# Patient Record
Sex: FEMALE | Race: WHITE | NOT HISPANIC OR LATINO | Employment: FULL TIME | URBAN - METROPOLITAN AREA
[De-identification: names, ages, dates, MRNs, and addresses within clinical notes are randomized per-mention and may not be internally consistent; named-entity substitution may affect disease eponyms.]

---

## 2017-07-24 ENCOUNTER — OFFICE VISIT (OUTPATIENT)
Dept: OBSTETRICS AND GYNECOLOGY | Age: 47
End: 2017-07-24

## 2017-07-24 VITALS
WEIGHT: 287 LBS | DIASTOLIC BLOOD PRESSURE: 80 MMHG | SYSTOLIC BLOOD PRESSURE: 126 MMHG | HEIGHT: 62 IN | BODY MASS INDEX: 52.81 KG/M2

## 2017-07-24 DIAGNOSIS — Z12.4 SCREENING FOR CERVICAL CANCER: ICD-10-CM

## 2017-07-24 DIAGNOSIS — Z01.419 ENCOUNTER FOR GYNECOLOGICAL EXAMINATION WITHOUT ABNORMAL FINDING: Primary | ICD-10-CM

## 2017-07-24 PROCEDURE — 99396 PREV VISIT EST AGE 40-64: CPT | Performed by: OBSTETRICS & GYNECOLOGY

## 2017-07-24 RX ORDER — OMEPRAZOLE 40 MG/1
CAPSULE, DELAYED RELEASE ORAL
Refills: 3 | COMMUNITY
Start: 2017-06-07 | End: 2022-03-10 | Stop reason: SDUPTHER

## 2017-07-24 RX ORDER — HYDROCHLOROTHIAZIDE 12.5 MG/1
CAPSULE, GELATIN COATED ORAL
Refills: 3 | COMMUNITY
Start: 2017-06-07 | End: 2021-09-07

## 2017-07-24 NOTE — PROGRESS NOTES
Routine Annual Visit    2017    Patient: Serenity Fleming          MR#:[id number]      Chief Complaint   Patient presents with   • Annual Exam     PT HERE FOR ROUTINE ANNUAL EXAM WITH MG. SHE IS DOING WELL, SHE DOES COMPLAIN OF SOME PAIN ON RIGHT SIDE, BUT DOES HAVE PCOS. OTHERWISE DOING WELL, WITH NO NEW MEDICAL UPDATES. LAST PAP  (NEG AND NEG HPV).       History of Present Illness    47 y.o. female  who presents for annual exam.   Doing well, no menses since ablation  Son is 16- bought his own truck   Due for pap  mammo today  occ R sided pain but comes and goes and may be cyclic  Works at [employer]- 12 hour shifts full time          No LMP recorded. Patient has had an ablation.  Obstetric History:  OB History      Para Term  AB TAB SAB Ectopic Multiple Living    1 0 0                Menstrual History:     No LMP recorded. Patient has had an ablation.       Sexual History:       ________________________________________  There is no problem list on file for this patient.      Past Medical History:   Diagnosis Date   • GERD (gastroesophageal reflux disease)    • Heavy menses    • Hypertension        Past Surgical History:   Procedure Laterality Date   • ADENOIDECTOMY     •  SECTION     • CHOLECYSTECTOMY     • DILATATION AND CURETTAGE     • ENDOMETRIAL ABLATION     • KNEE CARTILAGE SURGERY     • ROTATOR CUFF REPAIR     • TONSILLECTOMY         History   Smoking Status   • Never Smoker   Smokeless Tobacco   • Not on file       has a current medication list which includes the following prescription(s): hydrochlorothiazide and omeprazole.  ________________________________________    Current contraception: none  History of abnormal Pap smear: no  Family history of Breast cancer: no  Family history of uterine or ovarian cancer: no  Family History of colon cancer/colon polyps: no  History of abnormal mammogram: no      The following portions of the patient's history were reviewed and  "updated as appropriate: allergies, current medications, past family history, past medical history, past social history, past surgical history and problem list.    Review of Systems    Pertinent items are noted in HPI.     Objective   Physical Exam    /80  Ht 62\" (157.5 cm)  Wt 287 lb (130 kg)  BMI 52.49 kg/m2   BP Readings from Last 3 Encounters:   07/24/17 126/80      Wt Readings from Last 3 Encounters:   07/24/17 287 lb (130 kg)      BMI: Estimated body mass index is 52.49 kg/(m^2) as calculated from the following:    Height as of this encounter: 62\" (157.5 cm).    Weight as of this encounter: 287 lb (130 kg).      General:   alert, appears stated age and cooperative   Abdomen: soft, non-tender, without masses or organomegaly   Breast: inspection negative, no nipple discharge or bleeding, no masses or nodularity palpable   Vulva: normal   Vagina: normal mucosa   Cervix: no cervical motion tenderness and no lesions   Uterus: normal size, mobile or non-tender   Adnexa: normal adnexa and no mass, fullness, tenderness     Assessment:    1. Normal annual exam   Assessment     ICD-10-CM ICD-9-CM   1. Encounter for gynecological examination without abnormal finding Z01.419 V72.31   2. Screening for cervical cancer Z12.4 V76.2     Plan:    Plan     [x]  Mammogram request made  [x]  PAP done  []  Labs:   []  GC/Chl/TV  []  DEXA scan   []  Referral for colonoscopy:       Serenity was seen today for annual exam.    Diagnoses and all orders for this visit:    Encounter for gynecological examination without abnormal finding    Screening for cervical cancer  -     IGP, Apt HPV,rfx 16 / 18,45            Counseling:  --Nutrition: Stressed importance of moderation and caloric balance, stressed fresh fruit and vegetables  --Exercise: Stressed the importance of regular exercise. 3-5 times weekly   - Discussed screening mammogram recommendations.   --Discussed benefits of screening colonoscopy- age 50 unless FH  --Discussed pap " smear screening recommendations

## 2017-07-27 LAB
CYTOLOGIST CVX/VAG CYTO: NORMAL
CYTOLOGY CVX/VAG DOC THIN PREP: NORMAL
DX ICD CODE: NORMAL
HIV 1 & 2 AB SER-IMP: NORMAL
HPV I/H RISK 4 DNA CVX QL PROBE+SIG AMP: NEGATIVE
OTHER STN SPEC: NORMAL
PATH REPORT.FINAL DX SPEC: NORMAL
STAT OF ADQ CVX/VAG CYTO-IMP: NORMAL

## 2018-05-21 ENCOUNTER — OFFICE VISIT CONVERTED (OUTPATIENT)
Dept: SURGERY | Facility: CLINIC | Age: 48
End: 2018-05-21
Attending: SURGERY

## 2019-01-21 ENCOUNTER — HOSPITAL ENCOUNTER (OUTPATIENT)
Dept: LAB | Facility: HOSPITAL | Age: 49
Discharge: HOME | End: 2019-01-21
Attending: INTERNAL MEDICINE

## 2019-01-21 LAB
ALBUMIN SERPL-MCNC: 4.2 G/DL (ref 3.5–5)
ALBUMIN/GLOB SERPL: 1.2 {RATIO} (ref 1.4–2.6)
ALP SERPL-CCNC: 81 U/L (ref 42–98)
ALT SERPL-CCNC: 36 U/L (ref 10–40)
ANION GAP SERPL CALC-SCNC: 18 MMOL/L (ref 8–19)
AST SERPL-CCNC: 36 U/L (ref 15–50)
BASOPHILS # BLD AUTO: 0.02 10*3/UL (ref 0–0.2)
BASOPHILS NFR BLD AUTO: 0.22 % (ref 0–3)
BILIRUB SERPL-MCNC: 0.33 MG/DL (ref 0.2–1.3)
BUN SERPL-MCNC: 6 MG/DL (ref 5–25)
BUN/CREAT SERPL: 8 {RATIO} (ref 6–20)
CALCIUM SERPL-MCNC: 9.5 MG/DL (ref 8.7–10.4)
CHLORIDE SERPL-SCNC: 97 MMOL/L (ref 99–111)
CHOLEST SERPL-MCNC: 168 MG/DL (ref 107–200)
CHOLEST/HDLC SERPL: 3.1 {RATIO} (ref 3–6)
CONV CO2: 25 MMOL/L (ref 22–32)
CONV TOTAL PROTEIN: 7.8 G/DL (ref 6.3–8.2)
CREAT UR-MCNC: 0.76 MG/DL (ref 0.5–0.9)
EOSINOPHIL # BLD AUTO: 0.25 10*3/UL (ref 0–0.7)
EOSINOPHIL # BLD AUTO: 3.18 % (ref 0–7)
ERYTHROCYTE [DISTWIDTH] IN BLOOD BY AUTOMATED COUNT: 13.4 % (ref 11.5–14.5)
FERRITIN SERPL-MCNC: 52 NG/ML (ref 10–200)
GFR SERPLBLD BASED ON 1.73 SQ M-ARVRAT: >60 ML/MIN/{1.73_M2}
GLOBULIN UR ELPH-MCNC: 3.6 G/DL (ref 2–3.5)
GLUCOSE SERPL-MCNC: 106 MG/DL (ref 65–99)
HBA1C MFR BLD: 13.9 G/DL (ref 12–16)
HCT VFR BLD AUTO: 41 % (ref 37–47)
HDLC SERPL-MCNC: 55 MG/DL (ref 40–60)
IRON SATN MFR SERPL: 15 % (ref 20–55)
IRON SERPL-MCNC: 61 UG/DL (ref 60–170)
LDLC SERPL CALC-MCNC: 92 MG/DL (ref 70–100)
LYMPHOCYTES # BLD AUTO: 1.57 10*3/UL (ref 1–5)
MCH RBC QN AUTO: 28.5 PG (ref 27–31)
MCHC RBC AUTO-ENTMCNC: 33.8 G/DL (ref 33–37)
MCV RBC AUTO: 84.1 FL (ref 81–99)
MONOCYTES # BLD AUTO: 0.58 10*3/UL (ref 0.2–1.2)
MONOCYTES NFR BLD AUTO: 7.38 % (ref 3–10)
NEUTROPHILS # BLD AUTO: 5.41 10*3/UL (ref 2–8)
NEUTROPHILS NFR BLD AUTO: 69.2 % (ref 30–85)
NRBC BLD AUTO-RTO: 0 % (ref 0–0.01)
OSMOLALITY SERPL CALC.SUM OF ELEC: 280 MOSM/KG (ref 273–304)
PLATELET # BLD AUTO: 357 10*3/UL (ref 130–400)
PMV BLD AUTO: 7.8 FL (ref 7.4–10.4)
POTASSIUM SERPL-SCNC: 3.8 MMOL/L (ref 3.5–5.3)
RBC # BLD AUTO: 4.88 10*6/UL (ref 4.2–5.4)
SODIUM SERPL-SCNC: 136 MMOL/L (ref 135–147)
TIBC SERPL-MCNC: 409 UG/DL (ref 245–450)
TRANSFERRIN SERPL-MCNC: 286 MG/DL (ref 250–380)
TRIGL SERPL-MCNC: 103 MG/DL (ref 40–150)
VARIANT LYMPHS NFR BLD MANUAL: 20 % (ref 20–45)
VLDLC SERPL-MCNC: 21 MG/DL (ref 5–37)
WBC # BLD AUTO: 7.82 10*3/UL (ref 4.8–10.8)

## 2019-01-28 ENCOUNTER — OFFICE VISIT (OUTPATIENT)
Dept: OBSTETRICS AND GYNECOLOGY | Age: 49
End: 2019-01-28

## 2019-01-28 ENCOUNTER — PROCEDURE VISIT (OUTPATIENT)
Dept: OBSTETRICS AND GYNECOLOGY | Age: 49
End: 2019-01-28

## 2019-01-28 ENCOUNTER — APPOINTMENT (OUTPATIENT)
Dept: WOMENS IMAGING | Facility: HOSPITAL | Age: 49
End: 2019-01-28

## 2019-01-28 VITALS
SYSTOLIC BLOOD PRESSURE: 160 MMHG | WEIGHT: 293 LBS | HEIGHT: 66 IN | DIASTOLIC BLOOD PRESSURE: 90 MMHG | BODY MASS INDEX: 47.09 KG/M2

## 2019-01-28 DIAGNOSIS — R10.2 PELVIC PAIN IN FEMALE: ICD-10-CM

## 2019-01-28 DIAGNOSIS — Z12.31 VISIT FOR SCREENING MAMMOGRAM: Primary | ICD-10-CM

## 2019-01-28 DIAGNOSIS — Z01.419 ENCOUNTER FOR GYNECOLOGICAL EXAMINATION WITHOUT ABNORMAL FINDING: Primary | ICD-10-CM

## 2019-01-28 PROCEDURE — 77067 SCR MAMMO BI INCL CAD: CPT | Performed by: RADIOLOGY

## 2019-01-28 PROCEDURE — 99396 PREV VISIT EST AGE 40-64: CPT | Performed by: OBSTETRICS & GYNECOLOGY

## 2019-01-28 PROCEDURE — 77067 SCR MAMMO BI INCL CAD: CPT | Performed by: OBSTETRICS & GYNECOLOGY

## 2019-01-28 RX ORDER — FLUCONAZOLE 150 MG/1
150 TABLET ORAL
Qty: 2 TABLET | Refills: 1 | Status: SHIPPED | OUTPATIENT
Start: 2019-01-28 | End: 2019-02-05

## 2019-01-28 NOTE — PROGRESS NOTES
Routine Annual Visit    2019    Patient: Serenity Fleming          MR#:[id number]      Chief Complaint   Patient presents with   • Annual Exam     PT HERE FOR ROUTINE AE AND MG. SHE IS HAVING SOME RLQ PAIN BUT DOES HAVE PCOS. LAST PAP  NEG AND NEG HPV.       History of Present Illness    48 y.o. female  who presents for annual exam.   No menses since ablation  Having occ NS  Still with intermittent R sided pain  Feels like ovarian pain to her  Not severe, doesn't take meds    Son is 17- a leslie  May be a  or     UTD pap  mammo done today  CSC just done in November, had 4 polyps and will need repeat in         No LMP recorded. Patient has had an ablation.  Obstetric History:  OB History      Para Term  AB Living    1 0 0          SAB TAB Ectopic Molar Multiple Live Births                        Menstrual History:     No LMP recorded. Patient has had an ablation.       Sexual History:       ________________________________________  There is no problem list on file for this patient.      Past Medical History:   Diagnosis Date   • GERD (gastroesophageal reflux disease)    • Heavy menses    • Hypertension        Past Surgical History:   Procedure Laterality Date   • ADENOIDECTOMY     •  SECTION     • CHOLECYSTECTOMY     • DILATATION AND CURETTAGE     • ENDOMETRIAL ABLATION     • KNEE CARTILAGE SURGERY     • ROTATOR CUFF REPAIR     • TONSILLECTOMY         Social History     Tobacco Use   Smoking Status Never Smoker       has a current medication list which includes the following prescription(s): hydrochlorothiazide, omeprazole, and fluconazole.  ________________________________________    Current contraception: tubal ligation  History of abnormal Pap smear: no  Family history of Breast cancer: no  Family history of uterine or ovarian cancer: no  Family History of colon cancer/colon polyps: no  History of abnormal mammogram: no      The following portions of the  "patient's history were reviewed and updated as appropriate: allergies, current medications, past family history, past medical history, past social history, past surgical history and problem list.    Review of Systems    Pertinent items are noted in HPI.     Objective   Physical Exam    /90   Ht 167.6 cm (66\")   Wt 135 kg (297 lb)   BMI 47.94 kg/m²    BP Readings from Last 3 Encounters:   01/28/19 160/90   07/24/17 126/80      Wt Readings from Last 3 Encounters:   01/28/19 135 kg (297 lb)   07/24/17 130 kg (287 lb)      BMI: Estimated body mass index is 47.94 kg/m² as calculated from the following:    Height as of this encounter: 167.6 cm (66\").    Weight as of this encounter: 135 kg (297 lb).      General:   alert, appears stated age and cooperative   Abdomen: soft, non-tender, without masses or organomegaly   Breast: inspection negative, no nipple discharge or bleeding, no masses or nodularity palpable   Vulva: normal   Vagina: normal mucosa   Cervix: no cervical motion tenderness and no lesions   Uterus: normal size, mobile or non-tender   Adnexa: no mass, fullness, tenderness     Assessment:    1. Normal annual exam   Assessment     ICD-10-CM ICD-9-CM   1. Encounter for gynecological examination without abnormal finding Z01.419 V72.31   2. Pelvic pain in female R10.2 625.9     Plan:    Plan     [x]  Mammogram request made  []  PAP done  []  Labs:   []  GC/Chl/TV  []  DEXA scan   []  Referral for colonoscopy:       Serenity was seen today for annual exam.    Diagnoses and all orders for this visit:    Encounter for gynecological examination without abnormal finding    Pelvic pain in female    Other orders  -     fluconazole (DIFLUCAN) 150 MG tablet; Take 1 tablet by mouth Every 7 (Seven) Days for 2 doses.    schedule GYN US for pelvic pain, exam is negative but limited by habitus    Requested diflucan for VVC post antibiotics    Counseling:  --Nutrition: Stressed importance of moderation and caloric balance, " stressed fresh fruit and vegetables  --Exercise: Stressed the importance of regular exercise. 3-5 times weekly   - Discussed screening mammogram recommendations.   --Discussed benefits of screening colonoscopy- age 45 unless FH  --Discussed pap smear screening recommendations

## 2019-02-04 ENCOUNTER — TELEPHONE (OUTPATIENT)
Dept: OBSTETRICS AND GYNECOLOGY | Age: 49
End: 2019-02-04

## 2019-02-12 ENCOUNTER — OFFICE VISIT (OUTPATIENT)
Dept: OBSTETRICS AND GYNECOLOGY | Age: 49
End: 2019-02-12

## 2019-02-12 ENCOUNTER — PROCEDURE VISIT (OUTPATIENT)
Dept: OBSTETRICS AND GYNECOLOGY | Age: 49
End: 2019-02-12

## 2019-02-12 VITALS
DIASTOLIC BLOOD PRESSURE: 82 MMHG | BODY MASS INDEX: 48.82 KG/M2 | WEIGHT: 293 LBS | SYSTOLIC BLOOD PRESSURE: 144 MMHG | HEIGHT: 65 IN

## 2019-02-12 DIAGNOSIS — R10.2 PELVIC PAIN IN FEMALE: Primary | ICD-10-CM

## 2019-02-12 PROCEDURE — 76830 TRANSVAGINAL US NON-OB: CPT | Performed by: OBSTETRICS & GYNECOLOGY

## 2019-02-12 PROCEDURE — 99213 OFFICE O/P EST LOW 20 MIN: CPT | Performed by: OBSTETRICS & GYNECOLOGY

## 2019-02-12 NOTE — PROGRESS NOTES
GYN Visit    2019    Patient: Serenity Fleming          MR#:[id number]      Chief Complaint   Patient presents with   • Imaging Only     PT HERE FOR GYN U/S DUE TO PELVIC PAIN, SHE IS WELL.       History of Present Illness    48 y.o. female  who presents for follow up evaluation for right sided pelvic pain  This happens intermittently and is not severe but noticeable  May be cyclic but difficult to tell as pt doesn't have menses since ablation  No dysuria, no GI issues, does not take meds for it  Recent CSC done and not pathology that is a concern for pain  Here for US evaluation as clinical exam was negative         No LMP recorded. Patient has had an ablation.    ________________________________________  There is no problem list on file for this patient.      Past Medical History:   Diagnosis Date   • GERD (gastroesophageal reflux disease)    • Heavy menses    • Hypertension        Past Surgical History:   Procedure Laterality Date   • ADENOIDECTOMY     •  SECTION     • CHOLECYSTECTOMY     • DILATATION AND CURETTAGE     • ENDOMETRIAL ABLATION     • KNEE CARTILAGE SURGERY     • ROTATOR CUFF REPAIR     • TONSILLECTOMY         Social History     Tobacco Use   Smoking Status Never Smoker       has a current medication list which includes the following prescription(s): hydrochlorothiazide and omeprazole.  ________________________________________    Current contraception: tubal ligation      The following portions of the patient's history were reviewed and updated as appropriate: allergies, current medications, past family history, past medical history, past social history, past surgical history and problem list.    Review of Systems   Constitutional: Negative for chills and fever.   Gastrointestinal: Negative for nausea and vomiting.   Genitourinary: Positive for pelvic pain. Negative for dysuria, hematuria, urgency, vaginal bleeding and vaginal discharge.   Neurological: Negative for headaches.  "  Psychiatric/Behavioral: Negative for dysphoric mood.   All other systems reviewed and are negative.      Pertinent items are noted in HPI.     Objective   Physical Exam    /82   Ht 165.1 cm (65\")   Wt 134 kg (295 lb)   BMI 49.09 kg/m²    BP Readings from Last 3 Encounters:   02/12/19 144/82   01/28/19 160/90   07/24/17 126/80      Wt Readings from Last 3 Encounters:   02/12/19 134 kg (295 lb)   01/28/19 135 kg (297 lb)   07/24/17 130 kg (287 lb)      BMI: Estimated body mass index is 49.09 kg/m² as calculated from the following:    Height as of this encounter: 165.1 cm (65\").    Weight as of this encounter: 134 kg (295 lb).    Lungs: non labored breathing, no wheezing or tachpnea  Extremities: extremities normal, atraumatic, no cyanosis or edema  Skin: Skin color, texture, turgor normal. No rashes or lesions  Neurologic: Grossly normal  General:   alert, appears stated age and cooperative   Abdomen: soft, non-tender, without masses or organomegaly       Vulva: normal   Vagina: normal mucosa   Cervix: no cervical motion tenderness and no lesions   Uterus: normal size, mobile or non-tender   Adnexa: no mass, fullness, tenderness     Assessment:      Serenity was seen today for imaging only.    Diagnoses and all orders for this visit:    Pelvic pain in female        See US report  Lining thin  Small fluid area in lower uterine segment consistent with post ablation change  No free fluid  Ovaries normal    US reassuring, pain is low level and tolerable, could be ovulation pain , no worrisome pathology found on US  Follow up AE  Return to office if pain increases in intensity      "

## 2019-07-23 ENCOUNTER — HOSPITAL ENCOUNTER (OUTPATIENT)
Dept: GENERAL RADIOLOGY | Facility: HOSPITAL | Age: 49
Discharge: HOME | End: 2019-07-23
Attending: INTERNAL MEDICINE

## 2019-07-23 LAB
25(OH)D3 SERPL-MCNC: 12.7 NG/ML (ref 30–100)
ALBUMIN SERPL-MCNC: 4.1 G/DL (ref 3.5–5)
ALBUMIN/GLOB SERPL: 1.1 {RATIO} (ref 1.4–2.6)
ALP SERPL-CCNC: 77 U/L (ref 53–141)
ALT SERPL-CCNC: 48 U/L (ref 10–40)
ANION GAP SERPL CALC-SCNC: 19 MMOL/L (ref 8–19)
AST SERPL-CCNC: 49 U/L (ref 15–50)
BASOPHILS # BLD AUTO: 0.03 10*3/UL (ref 0–0.2)
BASOPHILS NFR BLD AUTO: 0.4 % (ref 0–3)
BILIRUB SERPL-MCNC: 0.32 MG/DL (ref 0.2–1.3)
BUN SERPL-MCNC: 4 MG/DL (ref 5–25)
BUN/CREAT SERPL: 6 {RATIO} (ref 6–20)
CALCIUM SERPL-MCNC: 9.4 MG/DL (ref 8.7–10.4)
CHLORIDE SERPL-SCNC: 96 MMOL/L (ref 99–111)
CHOLEST SERPL-MCNC: 178 MG/DL (ref 107–200)
CHOLEST/HDLC SERPL: 3.2 {RATIO} (ref 3–6)
CONV ABS IMM GRAN: 0.03 10*3/UL (ref 0–0.2)
CONV CO2: 27 MMOL/L (ref 22–32)
CONV IMMATURE GRAN: 0.4 % (ref 0–1.8)
CONV TOTAL PROTEIN: 7.7 G/DL (ref 6.3–8.2)
CREAT UR-MCNC: 0.72 MG/DL (ref 0.5–0.9)
DEPRECATED RDW RBC AUTO: 42.6 FL (ref 36.4–46.3)
EOSINOPHIL # BLD AUTO: 0.21 10*3/UL (ref 0–0.7)
EOSINOPHIL # BLD AUTO: 2.6 % (ref 0–7)
ERYTHROCYTE [DISTWIDTH] IN BLOOD BY AUTOMATED COUNT: 13.7 % (ref 11.7–14.4)
EST. AVERAGE GLUCOSE BLD GHB EST-MCNC: 143 MG/DL
FERRITIN SERPL-MCNC: 42 NG/ML (ref 10–200)
FOLATE SERPL-MCNC: 7.9 NG/ML (ref 4.8–20)
GFR SERPLBLD BASED ON 1.73 SQ M-ARVRAT: >60 ML/MIN/{1.73_M2}
GLOBULIN UR ELPH-MCNC: 3.6 G/DL (ref 2–3.5)
GLUCOSE SERPL-MCNC: 117 MG/DL (ref 65–99)
HBA1C MFR BLD: 13 G/DL (ref 12–16)
HBA1C MFR BLD: 6.6 % (ref 3.5–5.7)
HCT VFR BLD AUTO: 40.6 % (ref 37–47)
HDLC SERPL-MCNC: 55 MG/DL (ref 40–60)
IRON SATN MFR SERPL: 15 % (ref 20–55)
IRON SERPL-MCNC: 61 UG/DL (ref 60–170)
LDLC SERPL CALC-MCNC: 101 MG/DL (ref 70–100)
LYMPHOCYTES # BLD AUTO: 2.02 10*3/UL (ref 1–5)
MCH RBC QN AUTO: 27.5 PG (ref 27–31)
MCHC RBC AUTO-ENTMCNC: 32 G/DL (ref 33–37)
MCV RBC AUTO: 86 FL (ref 81–99)
MONOCYTES # BLD AUTO: 0.59 10*3/UL (ref 0.2–1.2)
MONOCYTES NFR BLD AUTO: 7.3 % (ref 3–10)
NEUTROPHILS # BLD AUTO: 5.24 10*3/UL (ref 2–8)
NEUTROPHILS NFR BLD AUTO: 64.4 % (ref 30–85)
NRBC CBCN: 0 % (ref 0–0.7)
OSMOLALITY SERPL CALC.SUM OF ELEC: 284 MOSM/KG (ref 273–304)
PLATELET # BLD AUTO: 314 10*3/UL (ref 130–400)
PMV BLD AUTO: 10.6 FL (ref 9.4–12.3)
POTASSIUM SERPL-SCNC: 3.5 MMOL/L (ref 3.5–5.3)
RBC # BLD AUTO: 4.72 10*6/UL (ref 4.2–5.4)
SODIUM SERPL-SCNC: 138 MMOL/L (ref 135–147)
TIBC SERPL-MCNC: 402 UG/DL (ref 245–450)
TRANSFERRIN SERPL-MCNC: 281 MG/DL (ref 250–380)
TRIGL SERPL-MCNC: 110 MG/DL (ref 40–150)
VARIANT LYMPHS NFR BLD MANUAL: 24.9 % (ref 20–45)
VIT B12 SERPL-MCNC: 350 PG/ML (ref 211–911)
VLDLC SERPL-MCNC: 22 MG/DL (ref 5–37)
WBC # BLD AUTO: 8.12 10*3/UL (ref 4.8–10.8)

## 2019-09-13 ENCOUNTER — HOSPITAL ENCOUNTER (OUTPATIENT)
Dept: INFUSION THERAPY | Facility: HOSPITAL | Age: 49
Setting detail: RECURRING SERIES
Discharge: HOME | End: 2019-09-30
Attending: INTERNAL MEDICINE

## 2019-11-19 ENCOUNTER — OFFICE VISIT CONVERTED (OUTPATIENT)
Dept: GASTROENTEROLOGY | Facility: CLINIC | Age: 49
End: 2019-11-19
Attending: NURSE PRACTITIONER

## 2019-12-31 ENCOUNTER — HOSPITAL ENCOUNTER (OUTPATIENT)
Dept: GASTROENTEROLOGY | Facility: HOSPITAL | Age: 49
Setting detail: HOSPITAL OUTPATIENT SURGERY
Discharge: HOME | End: 2019-12-31
Attending: INTERNAL MEDICINE

## 2019-12-31 LAB — HCG UR QL: NEGATIVE

## 2020-06-22 ENCOUNTER — OFFICE VISIT CONVERTED (OUTPATIENT)
Dept: GASTROENTEROLOGY | Facility: CLINIC | Age: 50
End: 2020-06-22
Attending: NURSE PRACTITIONER

## 2020-07-24 ENCOUNTER — HOSPITAL ENCOUNTER (OUTPATIENT)
Dept: GENERAL RADIOLOGY | Facility: HOSPITAL | Age: 50
Discharge: HOME | End: 2020-07-24
Attending: INTERNAL MEDICINE

## 2020-07-24 LAB
ALBUMIN SERPL-MCNC: 4.4 G/DL (ref 3.5–5)
ALBUMIN/GLOB SERPL: 1.3 {RATIO} (ref 1.4–2.6)
ALP SERPL-CCNC: 79 U/L (ref 53–141)
ALT SERPL-CCNC: 49 U/L (ref 10–40)
ANION GAP SERPL CALC-SCNC: 18 MMOL/L (ref 8–19)
AST SERPL-CCNC: 46 U/L (ref 15–50)
BASOPHILS # BLD AUTO: 0.05 10*3/UL (ref 0–0.2)
BASOPHILS NFR BLD AUTO: 0.6 % (ref 0–3)
BILIRUB SERPL-MCNC: 0.32 MG/DL (ref 0.2–1.3)
BUN SERPL-MCNC: 9 MG/DL (ref 5–25)
BUN/CREAT SERPL: 11 {RATIO} (ref 6–20)
CALCIUM SERPL-MCNC: 9.7 MG/DL (ref 8.7–10.4)
CHLORIDE SERPL-SCNC: 95 MMOL/L (ref 99–111)
CHOLEST SERPL-MCNC: 198 MG/DL (ref 107–200)
CHOLEST/HDLC SERPL: 3.6 {RATIO} (ref 3–6)
CONV ABS IMM GRAN: 0.03 10*3/UL (ref 0–0.2)
CONV CO2: 27 MMOL/L (ref 22–32)
CONV IMMATURE GRAN: 0.4 % (ref 0–1.8)
CONV TOTAL PROTEIN: 7.9 G/DL (ref 6.3–8.2)
CREAT UR-MCNC: 0.85 MG/DL (ref 0.5–0.9)
DEPRECATED RDW RBC AUTO: 42 FL (ref 36.4–46.3)
EOSINOPHIL # BLD AUTO: 0.22 10*3/UL (ref 0–0.7)
EOSINOPHIL # BLD AUTO: 2.8 % (ref 0–7)
ERYTHROCYTE [DISTWIDTH] IN BLOOD BY AUTOMATED COUNT: 13 % (ref 11.7–14.4)
EST. AVERAGE GLUCOSE BLD GHB EST-MCNC: 151 MG/DL
FOLATE SERPL-MCNC: 6.2 NG/ML (ref 4.8–20)
GFR SERPLBLD BASED ON 1.73 SQ M-ARVRAT: >60 ML/MIN/{1.73_M2}
GLOBULIN UR ELPH-MCNC: 3.5 G/DL (ref 2–3.5)
GLUCOSE SERPL-MCNC: 123 MG/DL (ref 65–99)
HBA1C MFR BLD: 6.9 % (ref 3.5–5.7)
HCT VFR BLD AUTO: 43.6 % (ref 37–47)
HDLC SERPL-MCNC: 55 MG/DL (ref 40–60)
HGB BLD-MCNC: 14.2 G/DL (ref 12–16)
IRON SATN MFR SERPL: 20 % (ref 20–55)
IRON SERPL-MCNC: 69 UG/DL (ref 60–170)
LDLC SERPL CALC-MCNC: 115 MG/DL (ref 70–100)
LYMPHOCYTES # BLD AUTO: 1.93 10*3/UL (ref 1–5)
LYMPHOCYTES NFR BLD AUTO: 24.3 % (ref 20–45)
MAGNESIUM SERPL-MCNC: 1.95 MG/DL (ref 1.6–2.3)
MCH RBC QN AUTO: 28.9 PG (ref 27–31)
MCHC RBC AUTO-ENTMCNC: 32.6 G/DL (ref 33–37)
MCV RBC AUTO: 88.6 FL (ref 81–99)
MONOCYTES # BLD AUTO: 0.48 10*3/UL (ref 0.2–1.2)
MONOCYTES NFR BLD AUTO: 6 % (ref 3–10)
NEUTROPHILS # BLD AUTO: 5.24 10*3/UL (ref 2–8)
NEUTROPHILS NFR BLD AUTO: 65.9 % (ref 30–85)
NRBC CBCN: 0 % (ref 0–0.7)
OSMOLALITY SERPL CALC.SUM OF ELEC: 282 MOSM/KG (ref 273–304)
PLATELET # BLD AUTO: 301 10*3/UL (ref 130–400)
PMV BLD AUTO: 11.4 FL (ref 9.4–12.3)
POTASSIUM SERPL-SCNC: 4 MMOL/L (ref 3.5–5.3)
RBC # BLD AUTO: 4.92 10*6/UL (ref 4.2–5.4)
SODIUM SERPL-SCNC: 136 MMOL/L (ref 135–147)
TIBC SERPL-MCNC: 346 UG/DL (ref 245–450)
TRANSFERRIN SERPL-MCNC: 242 MG/DL (ref 250–380)
TRIGL SERPL-MCNC: 140 MG/DL (ref 40–150)
VIT B12 SERPL-MCNC: 346 PG/ML (ref 211–911)
VLDLC SERPL-MCNC: 28 MG/DL (ref 5–37)
WBC # BLD AUTO: 7.95 10*3/UL (ref 4.8–10.8)

## 2020-07-25 LAB
25(OH)D3 SERPL-MCNC: 19.6 NG/ML (ref 30–100)
FERRITIN SERPL-MCNC: 181 NG/ML (ref 10–200)

## 2020-09-21 ENCOUNTER — PROCEDURE VISIT (OUTPATIENT)
Dept: OBSTETRICS AND GYNECOLOGY | Age: 50
End: 2020-09-21

## 2020-09-21 ENCOUNTER — APPOINTMENT (OUTPATIENT)
Dept: WOMENS IMAGING | Facility: HOSPITAL | Age: 50
End: 2020-09-21

## 2020-09-21 ENCOUNTER — OFFICE VISIT (OUTPATIENT)
Dept: OBSTETRICS AND GYNECOLOGY | Age: 50
End: 2020-09-21

## 2020-09-21 VITALS
HEIGHT: 62 IN | SYSTOLIC BLOOD PRESSURE: 144 MMHG | DIASTOLIC BLOOD PRESSURE: 84 MMHG | BODY MASS INDEX: 53.15 KG/M2 | WEIGHT: 288.8 LBS

## 2020-09-21 DIAGNOSIS — Z12.31 VISIT FOR SCREENING MAMMOGRAM: Primary | ICD-10-CM

## 2020-09-21 DIAGNOSIS — Z11.51 SCREENING FOR HPV (HUMAN PAPILLOMAVIRUS): ICD-10-CM

## 2020-09-21 DIAGNOSIS — Z32.00 ENCOUNTER FOR CONFIRMATION OF PREGNANCY TEST RESULT WITH PHYSICAL EXAMINATION: Primary | ICD-10-CM

## 2020-09-21 DIAGNOSIS — Z12.4 SCREENING FOR CERVICAL CANCER: ICD-10-CM

## 2020-09-21 PROCEDURE — 77067 SCR MAMMO BI INCL CAD: CPT | Performed by: OBSTETRICS & GYNECOLOGY

## 2020-09-21 PROCEDURE — 77067 SCR MAMMO BI INCL CAD: CPT | Performed by: RADIOLOGY

## 2020-09-21 PROCEDURE — 99396 PREV VISIT EST AGE 40-64: CPT | Performed by: OBSTETRICS & GYNECOLOGY

## 2020-09-21 RX ORDER — NYSTATIN 100000 [USP'U]/G
POWDER TOPICAL 2 TIMES DAILY
Qty: 60 G | Refills: 3 | Status: SHIPPED | OUTPATIENT
Start: 2020-09-21 | End: 2022-03-09

## 2020-09-21 NOTE — PROGRESS NOTES
Routine Annual Visit    2020    Patient: Serenity Fleming          MR#:[id number]      Chief Complaint   Patient presents with   • Gynecologic Exam     AE and Mg today, Last pap 17 neg/neg, Colonoscopy , No problems       History of Present Illness    50 y.o. female  who presents for annual exam.   Off and on itchy rash under breasts  Due for pap  occ right sided twinge  mammo today  UTD on CSC  Son is 18, graduated and currently working construction  Pt working as a middle school nurse            No LMP recorded. Patient has had an ablation.  Obstetric History:  OB History        1    Para   0    Term   0            AB        Living           SAB        TAB        Ectopic        Molar        Multiple        Live Births                   Menstrual History:     No LMP recorded. Patient has had an ablation.       Sexual History:       ________________________________________  There is no problem list on file for this patient.      Past Medical History:   Diagnosis Date   • GERD (gastroesophageal reflux disease)    • Heavy menses    • Hypertension        Past Surgical History:   Procedure Laterality Date   • ADENOIDECTOMY     •  SECTION     • CHOLECYSTECTOMY     • DILATATION AND CURETTAGE     • ENDOMETRIAL ABLATION     • KNEE CARTILAGE SURGERY     • ROTATOR CUFF REPAIR     • TONSILLECTOMY         Social History     Tobacco Use   Smoking Status Never Smoker   Smokeless Tobacco Never Used       has a current medication list which includes the following prescription(s): vitamin d3, hydrochlorothiazide, multiple vitamins-minerals, omeprazole, and nystatin.  ________________________________________    Current contraception: none  History of abnormal Pap smear: no  Family history of Breast cancer: no  Family history of uterine or ovarian cancer: no  Family History of colon cancer/colon polyps: no  History of abnormal mammogram: no      The following portions of the patient's history  "were reviewed and updated as appropriate: allergies, current medications, past family history, past medical history, past social history, past surgical history and problem list.    Review of Systems    Pertinent items are noted in HPI.     Objective   Physical Exam    /84   Ht 157.5 cm (62\")   Wt 131 kg (288 lb 12.8 oz)   Breastfeeding No   BMI 52.82 kg/m²    BP Readings from Last 3 Encounters:   09/21/20 144/84   02/12/19 144/82   01/28/19 160/90      Wt Readings from Last 3 Encounters:   09/21/20 131 kg (288 lb 12.8 oz)   02/12/19 134 kg (295 lb)   01/28/19 135 kg (297 lb)      BMI: Estimated body mass index is 52.82 kg/m² as calculated from the following:    Height as of this encounter: 157.5 cm (62\").    Weight as of this encounter: 131 kg (288 lb 12.8 oz).      General:   alert, appears stated age and cooperative   Abdomen: soft, non-tender, without masses or organomegaly   Breast: inspection negative, no nipple discharge or bleeding, no masses or nodularity palpable   Rash under bilateral breast c/w candida   Vulva: normal   Vagina: normal mucosa   Cervix: no cervical motion tenderness and no lesions   Uterus: normal size, mobile or non-tender   Adnexa: no mass, fullness, tenderness     Assessment:    1. Normal annual exam   Assessment     ICD-10-CM ICD-9-CM   1. Encounter for confirmation of pregnancy test result with physical examination  Z32.00 V72.40   2. Screening for cervical cancer  Z12.4 V76.2   3. Screening for HPV (human papillomavirus)  Z11.51 V73.81     Plan:    Plan     [x]  Mammogram request made  [x]  PAP done  []  Labs:   []  GC/Chl/TV  []  DEXA scan   []  Referral for colonoscopy:       Serenity was seen today for gynecologic exam.    Diagnoses and all orders for this visit:    Encounter for confirmation of pregnancy test result with physical examination    Screening for cervical cancer  -     IGP, Apt HPV,rfx 16 / 18,45    Screening for HPV (human papillomavirus)  -     IGP, Apt " HPV,rfx 16 / 18,45    Other orders  -     nystatin (MYCOSTATIN) 400125 UNIT/GM powder; Apply  topically to the appropriate area as directed 2 (Two) Times a Day.            Counseling:  --Nutrition: Stressed importance of moderation and caloric balance, stressed fresh fruit and vegetables  --Exercise: Stressed the importance of regular exercise. 3-5 times weekly   - Discussed screening mammogram recommendations.   --Discussed benefits of screening colonoscopy- age 45 unless FH  --Discussed pap smear screening recommendations

## 2020-09-23 LAB
CYTOLOGIST CVX/VAG CYTO: NORMAL
CYTOLOGY CVX/VAG DOC CYTO: NORMAL
CYTOLOGY CVX/VAG DOC THIN PREP: NORMAL
DX ICD CODE: NORMAL
HIV 1 & 2 AB SER-IMP: NORMAL
HPV I/H RISK 4 DNA CVX QL PROBE+SIG AMP: NEGATIVE
OTHER STN SPEC: NORMAL
STAT OF ADQ CVX/VAG CYTO-IMP: NORMAL

## 2020-09-24 ENCOUNTER — TELEPHONE (OUTPATIENT)
Dept: OBSTETRICS AND GYNECOLOGY | Age: 50
End: 2020-09-24

## 2020-10-02 ENCOUNTER — TELEPHONE (OUTPATIENT)
Dept: OBSTETRICS AND GYNECOLOGY | Age: 50
End: 2020-10-02

## 2020-10-02 NOTE — TELEPHONE ENCOUNTER
(Yared pt) Pt called, pt had mammo done on 9/21/2020 and hasn't heard anything on the results.  Dr. Vail has reviewed but no comments.    Please advise.    [phone]

## 2021-02-08 ENCOUNTER — OFFICE VISIT CONVERTED (OUTPATIENT)
Dept: GASTROENTEROLOGY | Facility: CLINIC | Age: 51
End: 2021-02-08
Attending: NURSE PRACTITIONER

## 2021-05-13 NOTE — PROGRESS NOTES
Progress Note      Patient Name: Serenity Cedillo   Patient ID: [id number]   Sex: Female   YOB: 1970    Primary Care Provider: Stefanie Thomas MD   Referring Provider: Stefanie Thomas MD    Visit Date: June 22, 2020    Provider: JAMESON Spence   Location: [facility]   Location Address: [address] [place] [address], [address]  [place], [place]  [id number]   Location Phone: [phone]          Chief Complaint  · Follow-up of EGD      History Of Present Illness     Ms. Cedillo presents for follow-up of dysphagia, GERD and history of colon polyps.    12/31/2019 EGD-Schatzki's ring in the GE junction.  Dilated with 15 mm balloon.  Normal mucosa in the middle third of the esophagus, biopsy-slight chronic inflammation.  5 cm hiatal hernia.  Erythema in the antrum.  Polyps (3-5 mm) in the fundus, biopsy consistent with hyperplastic polyps.  Normal duodenum.  Gastric antrum with reactive gastropathy and slight chronic inflammation.    She reports that dilation helped with dysphagia.  She is having to drink with each bite and reports that this is helpful. Reports that she sometimes feels like she can't push food down.  States that it feels like the muscles don't work correctly.  This occurs about every 2 weeks.  When this occurs, she has to stop and take a drink.      She states that omeprazole is controlling reflux.  However, she occasionally experiences reflux at night.  Trying to avoid foods 3-4 prior to bedtime.  She tried discontinuing omeprazole, but had constant heartburn that she was taking TUMS several times per day.      Admits a regular bowel pattern. Denies hematochezia and melena.             Past Medical History  Anemia; Colon Polyps; Essential hypertension; GERD; Hypertension; Reflux; Right shoulder scope-Aftercare following surgery of the musculoskeletal system         Past Surgical History  Cesarian Section; EGD; Gallbladder; MENISCUS TEAR; Rotator Cuff repair; Surgical Clips;  "Tonsillectomy; Uterine ablation         Medication List  hydrochlorothiazide 12.5 mg oral capsule; iron 325 mg (65 mg iron) oral tablet; omeprazole 20 mg oral capsule,delayed release(DR/EC); Vitamin C 100 mg oral tablet; Vitamin D3 2,000 unit oral capsule         Allergy List  Codiene       Allergies Reconciled  Family Medical History  Stomach Neoplasm, Malignant; Cancer, Unspecified; Diabetes, unspecified type; Hypertension; Lymphoma, Malignant; No family history of colorectal cancer         Social History  Alcohol (Never); Caffeine (Current some day); Claustophobic (Unknown); lives with children; lives with spouse; ; Recreational Drug Use (Never); Second hand smoke exposure (Never); Tobacco (Never); Working         Review of Systems  · Constitutional  o Denies  o : chills, fever  · Cardiovascular  o Denies  o : chest pain, irregular heart beats  · Respiratory  o Denies  o : cough, shortness of breath  · Gastrointestinal  o Admits  o : see HPI   · Endocrine  o Denies  o : weight gain, weight loss      Vitals  Date Time BP Position Site L\R Cuff Size HR RR TEMP (F) WT  HT  BMI kg/m2 BSA m2 O2 Sat        06/22/2020 09:42 /77 Sitting      98.3 294lbs 4oz 5'  3\" 52.12 2.44           Physical Examination  · Constitutional  o Appearance  o : morbidly obese, well developed, no obvious deformities present  · Head and Face  o Head  o : Normocephalic with no worriesome skin lesions  · Eyes  o Vision  o :   § Visual Fields  § : eyes move symmetrical in all directions  o Sclerae  o : sclerae anicteric  o Pupils and Irises  o : pupils equal and symmetrical  · Neck  o Inspection/Palpation  o : Trachea is midline, no adenopathy  · Respiratory  o Respiratory Effort  o : Breathing is unlabored.  o Inspection of Chest  o : normal appearance  o Auscultation of Lungs  o : Chest is clear to auscultation bilaterally.  · Cardiovascular  o Heart  o :   § Auscultation of Heart  § : no murmurs, rubs, or gallops  o Peripheral " Vascular System  o :   § Extremities  § : no cyanosis, clubbing or edema;   · Gastrointestinal  o Abdominal Examination  o : Abdomen is soft, nontender to palpation, with normal active bowel sounds, no appreciable hepatosplenomegaly.  o Digital Rectal Exam  o : deferred  · Skin and Subcutaneous Tissue  o General Inspection  o : without focal lesions; turgor is normal  · Psychiatric  o General  o : Alert and oriented x3  o Mood and Affect  o : Mood and affect are appropriate to circumstances  · Extremities  o Extremities  o : No edema, no cyanosis          Assessment  · Pharyngoesophageal dysphagia     787.24/R13.14  · Gastritis, Other specified     535.40  · Hernia, Hiatal     553.3/K44.9  · Schatzki's ring     750.3/K22.2  · Pre-op testing     V72.84/Z01.818    Problems Reconciled  Plan  · Orders  o Modified Barium Swallow (Includes Speech Path) [facility] ([id number]) - - 06/22/2020  o [facility] Pre-Op Covid-19 Screening (96758) - - 06/22/2020  · Medications  o omeprazole 20 mg oral capsule,delayed release(DR/EC)   SIG: take 1 capsule by oral route daily for 90 days   DISP: (90) capsules with 2 refills  Adjusted on 06/22/2020     o Medications have been Reconciled  o Transition of Care or Provider Policy  · Instructions  o Information given on current diagnoses. Recommend that eat small meals. Try to decrease omeprazole to 20 mg daily.  o Lifestyle modifications discussed.  · Disposition  o Follow up 6 months            Electronically Signed by: JAMESON Spence -Author on June 22, 2020 02:10:09 PM

## 2021-05-14 VITALS
HEART RATE: 79 BPM | BODY MASS INDEX: 51.83 KG/M2 | DIASTOLIC BLOOD PRESSURE: 73 MMHG | HEIGHT: 63 IN | SYSTOLIC BLOOD PRESSURE: 157 MMHG | WEIGHT: 292.5 LBS

## 2021-05-14 NOTE — PROGRESS NOTES
Progress Note      Patient Name: Serenity Cedillo   Patient ID: [id number]   Sex: Female   YOB: 1970    Primary Care Provider: Stefanie Thomas MD   Referring Provider: Stefanie Thomas MD    Visit Date: February 8, 2021    Provider: JAMESON Spence   Location: [facility]   Location Address: [address] [place] [address], [address] [place] [address]  [id number]   Location Phone: [phone]          History Of Present Illness     Ms. Cedillo presents for f/u of dysphagia, hiatal hernia and gastritis.      She reports that dysphagia is improved.    Admits occasional heartburn, but reports that it's not daily.  Only occurs about once per week.  Not requiring any PRN meds.      Denies change in bowel movements.  Denies rectal bleeding.           Past Medical History  Anemia; Colon Polyps; Essential hypertension; GERD; Hypertension; Reflux; Right shoulder scope-Aftercare following surgery of the musculoskeletal system         Past Surgical History  Cesarian Section; EGD; Gallbladder; MENISCUS TEAR; Rotator Cuff repair; Surgical Clips; Tonsillectomy; Uterine ablation         Medication List  hydrochlorothiazide 12.5 mg oral capsule; omeprazole 20 mg oral capsule,delayed release(DR/EC); Vitamin C 100 mg oral tablet; Vitamin D3 2,000 unit oral capsule         Allergy List  Codiene       Allergies Reconciled  Family Medical History  Stomach Neoplasm, Malignant; Cancer, Unspecified; Diabetes, unspecified type; Hypertension; Lymphoma, Malignant; No family history of colorectal cancer         Social History  Alcohol (Never); Caffeine (Current some day); Claustophobic (Unknown); lives with children; lives with spouse; ; Recreational Drug Use (Never); Second hand smoke exposure (Never); Tobacco (Never); Working         Review of Systems  · Constitutional  o Denies  o : chills, fever  · Cardiovascular  o Denies  o : chest pain, irregular heart beats  · Respiratory  o Denies  o : cough,  "shortness of breath  · Gastrointestinal  o Admits  o : see HPI   · Endocrine  o Denies  o : weight gain, weight loss      Vitals  Date Time BP Position Site L\R Cuff Size HR RR TEMP (F) WT  HT  BMI kg/m2 BSA m2 O2 Sat FR L/min FiO2 HC       02/08/2021 03:29 /73 Sitting    79 - R   292lbs 8oz 5'  3\" 51.81 2.43             Physical Examination  · Constitutional  o Appearance  o : Healthy-appearing, awake and alert in no acute distress  · Head and Face  o Head  o : Normocephalic with no worriesome skin lesions  · Eyes  o Vision  o :   § Visual Fields  § : eyes move symmetrical in all directions  o Sclerae  o : sclerae anicteric  o Pupils and Irises  o : pupils equal and symmetrical  · Neck  o Inspection/Palpation  o : Trachea is midline, no adenopathy  · Respiratory  o Respiratory Effort  o : Breathing is unlabored.  o Inspection of Chest  o : normal appearance  o Auscultation of Lungs  o : Chest is clear to auscultation bilaterally.  · Cardiovascular  o Heart  o :   § Auscultation of Heart  § : no murmurs, rubs, or gallops  o Peripheral Vascular System  o :   § Extremities  § : no cyanosis, clubbing or edema;   · Gastrointestinal  o Abdominal Examination  o : Abdomen is soft, nontender to palpation, with normal active bowel sounds, no appreciable hepatosplenomegaly.  o Digital Rectal Exam  o : deferred  · Skin and Subcutaneous Tissue  o General Inspection  o : without focal lesions; turgor is normal  · Psychiatric  o General  o : Alert and oriented x3  o Mood and Affect  o : Mood and affect are appropriate to circumstances  · Extremities  o Extremities  o : No edema, no cyanosis          Assessment  · Dysphagia     787.20/R13.10  · Gastritis, Other specified     535.40  · Hernia, Hiatal     553.3/K44.9      Plan  · Medications  o omeprazole 20 mg oral capsule,delayed release(DR/EC)   SIG: take 1 capsule by oral route daily for 90 days   DISP: (90) Capsule with 3 refills  Refilled on 02/08/2021     o Medications " have been Reconciled  · Instructions  o Information given on current diagnoses.  · Disposition  o Follow up 1 year            Electronically Signed by: JAMESON Spence -Author on February 9, 2021 01:18:58 PM

## 2021-05-15 VITALS
TEMPERATURE: 98.3 F | BODY MASS INDEX: 51.91 KG/M2 | DIASTOLIC BLOOD PRESSURE: 77 MMHG | HEIGHT: 63 IN | SYSTOLIC BLOOD PRESSURE: 144 MMHG | WEIGHT: 293 LBS

## 2021-05-15 VITALS
DIASTOLIC BLOOD PRESSURE: 96 MMHG | WEIGHT: 293 LBS | BODY MASS INDEX: 53.92 KG/M2 | HEIGHT: 62 IN | SYSTOLIC BLOOD PRESSURE: 151 MMHG

## 2021-05-16 VITALS — HEIGHT: 62 IN | RESPIRATION RATE: 14 BRPM | WEIGHT: 292.5 LBS | BODY MASS INDEX: 53.83 KG/M2

## 2021-09-07 RX ORDER — HYDROCHLOROTHIAZIDE 12.5 MG/1
CAPSULE, GELATIN COATED ORAL
Qty: 90 CAPSULE | Refills: 3 | Status: SHIPPED | OUTPATIENT
Start: 2021-09-07 | End: 2022-09-08 | Stop reason: SDUPTHER

## 2021-09-23 ENCOUNTER — PROCEDURE VISIT (OUTPATIENT)
Dept: OBSTETRICS AND GYNECOLOGY | Age: 51
End: 2021-09-23

## 2021-09-23 ENCOUNTER — OFFICE VISIT (OUTPATIENT)
Dept: OBSTETRICS AND GYNECOLOGY | Age: 51
End: 2021-09-23

## 2021-09-23 ENCOUNTER — APPOINTMENT (OUTPATIENT)
Dept: WOMENS IMAGING | Facility: HOSPITAL | Age: 51
End: 2021-09-23

## 2021-09-23 VITALS
DIASTOLIC BLOOD PRESSURE: 74 MMHG | BODY MASS INDEX: 51.03 KG/M2 | SYSTOLIC BLOOD PRESSURE: 134 MMHG | HEIGHT: 63 IN | WEIGHT: 288 LBS

## 2021-09-23 DIAGNOSIS — Z12.31 VISIT FOR SCREENING MAMMOGRAM: Primary | ICD-10-CM

## 2021-09-23 DIAGNOSIS — Z01.419 ENCOUNTER FOR GYNECOLOGICAL EXAMINATION WITHOUT ABNORMAL FINDING: Primary | ICD-10-CM

## 2021-09-23 PROCEDURE — 77063 BREAST TOMOSYNTHESIS BI: CPT | Performed by: RADIOLOGY

## 2021-09-23 PROCEDURE — 77067 SCR MAMMO BI INCL CAD: CPT | Performed by: RADIOLOGY

## 2021-09-23 PROCEDURE — 77067 SCR MAMMO BI INCL CAD: CPT | Performed by: OBSTETRICS & GYNECOLOGY

## 2021-09-23 PROCEDURE — 99396 PREV VISIT EST AGE 40-64: CPT | Performed by: OBSTETRICS & GYNECOLOGY

## 2021-09-23 PROCEDURE — 77063 BREAST TOMOSYNTHESIS BI: CPT | Performed by: OBSTETRICS & GYNECOLOGY

## 2021-09-23 NOTE — PROGRESS NOTES
Routine Annual Visit    2021    Patient: Serenity Fleming          MR#:[id number]      Chief Complaint   Patient presents with   • Gynecologic Exam     last pap 2020 neg, mg today        History of Present Illness    51 y.o. female  who presents for annual exam.   Some NS, no HF  No bleeding, pap UTD  mammo today  [facility] [place]  Son 21yo, works construction, living at home right now  Pt working as school nurse  No other issues        No LMP recorded (lmp unknown). Patient has had an ablation.  Obstetric History:  OB History        1    Para   0    Term   0            AB        Living           SAB        TAB        Ectopic        Molar        Multiple        Live Births                   Menstrual History:     No LMP recorded (lmp unknown). Patient has had an ablation.       Sexual History:       ________________________________________  There is no problem list on file for this patient.      Past Medical History:   Diagnosis Date   • Acid reflux    • Anemia    • Claustrophobia    • Colon polyps    • Condition not found 2016    right shoulder scope aftercare following surgery of the musuloskeletal system   • Essential hypertension    • GERD (gastroesophageal reflux disease)    • Heavy menses    • Hypertension        Past Surgical History:   Procedure Laterality Date   • ADENOIDECTOMY     •  SECTION     • CHOLECYSTECTOMY     • DILATATION AND CURETTAGE     • ENDOMETRIAL ABLATION     • ENDOSCOPY     • GALLBLADDER SURGERY     • KNEE CARTILAGE SURGERY     • MENISCECTOMY Right     meniscus tear   • OTHER SURGICAL HISTORY      surgical clips    • OTHER SURGICAL HISTORY      uterine ablation    • ROTATOR CUFF REPAIR     • TONSILLECTOMY         Social History     Tobacco Use   Smoking Status Never Smoker   Smokeless Tobacco Never Used       has a current medication list which includes the following prescription(s): vitamin d3, hydrochlorothiazide, omeprazole, multiple vitamin,  "and nystatin.  ________________________________________    Current contraception: post menopausal status  History of abnormal Pap smear: no  Family history of Breast cancer: no  Family history of uterine or ovarian cancer: no  Family History of colon cancer/colon polyps: no  History of abnormal mammogram: no      The following portions of the patient's history were reviewed and updated as appropriate: allergies, current medications, past family history, past medical history, past social history, past surgical history and problem list.    Review of Systems    Pertinent items are noted in HPI.     Objective   Physical Exam    /74   Ht 160 cm (63\")   Wt 131 kg (288 lb)   LMP  (LMP Unknown)   Breastfeeding No   BMI 51.02 kg/m²    BP Readings from Last 3 Encounters:   09/23/21 134/74   02/08/21 157/73   09/21/20 144/84      Wt Readings from Last 3 Encounters:   09/23/21 131 kg (288 lb)   02/08/21 133 kg (292 lb 8 oz)   09/21/20 131 kg (288 lb 12.8 oz)      BMI: Estimated body mass index is 51.02 kg/m² as calculated from the following:    Height as of this encounter: 160 cm (63\").    Weight as of this encounter: 131 kg (288 lb).      General:   alert, appears stated age and cooperative   Abdomen: soft, non-tender, without masses or organomegaly   Breast: inspection negative, no nipple discharge or bleeding, no masses or nodularity palpable   Vulva: normal   Vagina: normal mucosa   Cervix: no cervical motion tenderness and no lesions   Uterus: normal size, mobile and non-tender   Adnexa: no mass, fullness, tenderness     Assessment:    1. Normal annual exam   Assessment     ICD-10-CM ICD-9-CM   1. Encounter for gynecological examination without abnormal finding  Z01.419 V72.31     Plan:    Plan     [x]  Mammogram request made  []  PAP done  []  Labs:   []  GC/Chl/TV  []  DEXA scan   []  Referral for colonoscopy:       Diagnoses and all orders for this visit:    1. Encounter for gynecological examination without " abnormal finding (Primary)            Counseling:  --Nutrition: Stressed importance of moderation and caloric balance, stressed fresh fruit and vegetables  --Exercise: Stressed the importance of regular exercise. 3-5 times weekly   - Discussed screening mammogram recommendations.   --Discussed benefits of screening colonoscopy- age 45 unless FH  --Discussed pap smear screening recommendations

## 2022-02-02 DIAGNOSIS — E55.9 VITAMIN D DEFICIENCY: ICD-10-CM

## 2022-02-02 DIAGNOSIS — E53.8 B12 DEFICIENCY: ICD-10-CM

## 2022-02-02 DIAGNOSIS — Z79.4 TYPE 2 DIABETES MELLITUS WITH HYPERGLYCEMIA, WITH LONG-TERM CURRENT USE OF INSULIN: ICD-10-CM

## 2022-02-02 DIAGNOSIS — D50.9 IRON DEFICIENCY ANEMIA, UNSPECIFIED IRON DEFICIENCY ANEMIA TYPE: ICD-10-CM

## 2022-02-02 DIAGNOSIS — E83.42 HYPOMAGNESEMIA: Primary | ICD-10-CM

## 2022-02-02 DIAGNOSIS — R53.83 FATIGUE, UNSPECIFIED TYPE: ICD-10-CM

## 2022-02-02 DIAGNOSIS — E78.5 HYPERLIPIDEMIA, UNSPECIFIED HYPERLIPIDEMIA TYPE: ICD-10-CM

## 2022-02-02 DIAGNOSIS — E11.65 TYPE 2 DIABETES MELLITUS WITH HYPERGLYCEMIA, WITH LONG-TERM CURRENT USE OF INSULIN: ICD-10-CM

## 2022-02-02 PROBLEM — K21.9 ESOPHAGEAL REFLUX: Status: ACTIVE | Noted: 2022-02-02

## 2022-02-02 PROBLEM — D64.9 ANEMIA: Status: ACTIVE | Noted: 2022-02-02

## 2022-02-02 PROBLEM — K63.5 COLON POLYPS: Status: ACTIVE | Noted: 2022-02-02

## 2022-02-02 PROBLEM — J45.909 ASTHMA: Status: ACTIVE | Noted: 2022-02-02

## 2022-02-02 PROBLEM — I10 ESSENTIAL HYPERTENSION: Status: ACTIVE | Noted: 2022-02-02

## 2022-03-02 RX ORDER — OMEPRAZOLE 20 MG/1
CAPSULE, DELAYED RELEASE ORAL
Qty: 90 CAPSULE | Refills: 3 | OUTPATIENT
Start: 2022-03-02

## 2022-03-09 RX ORDER — OMEPRAZOLE 20 MG/1
20 CAPSULE, DELAYED RELEASE ORAL DAILY
COMMUNITY
Start: 2021-12-02 | End: 2022-03-10 | Stop reason: SDUPTHER

## 2022-03-10 ENCOUNTER — OFFICE VISIT (OUTPATIENT)
Dept: INTERNAL MEDICINE | Facility: CLINIC | Age: 52
End: 2022-03-10

## 2022-03-10 VITALS
OXYGEN SATURATION: 98 % | HEIGHT: 63 IN | HEART RATE: 72 BPM | WEIGHT: 293 LBS | DIASTOLIC BLOOD PRESSURE: 80 MMHG | BODY MASS INDEX: 51.91 KG/M2 | SYSTOLIC BLOOD PRESSURE: 140 MMHG | TEMPERATURE: 97.3 F

## 2022-03-10 DIAGNOSIS — E66.01 MORBID (SEVERE) OBESITY DUE TO EXCESS CALORIES: ICD-10-CM

## 2022-03-10 DIAGNOSIS — I10 ESSENTIAL HYPERTENSION: ICD-10-CM

## 2022-03-10 DIAGNOSIS — E55.9 VITAMIN D DEFICIENCY: ICD-10-CM

## 2022-03-10 DIAGNOSIS — E11.65 TYPE 2 DIABETES MELLITUS WITH HYPERGLYCEMIA, WITH LONG-TERM CURRENT USE OF INSULIN: ICD-10-CM

## 2022-03-10 DIAGNOSIS — E78.5 HYPERLIPIDEMIA, UNSPECIFIED HYPERLIPIDEMIA TYPE: ICD-10-CM

## 2022-03-10 DIAGNOSIS — Z13.9 SCREENING DUE: Primary | ICD-10-CM

## 2022-03-10 DIAGNOSIS — K21.9 GASTROESOPHAGEAL REFLUX DISEASE WITHOUT ESOPHAGITIS: ICD-10-CM

## 2022-03-10 DIAGNOSIS — D50.9 IRON DEFICIENCY ANEMIA, UNSPECIFIED IRON DEFICIENCY ANEMIA TYPE: ICD-10-CM

## 2022-03-10 DIAGNOSIS — R53.83 FATIGUE, UNSPECIFIED TYPE: ICD-10-CM

## 2022-03-10 DIAGNOSIS — Z79.4 TYPE 2 DIABETES MELLITUS WITH HYPERGLYCEMIA, WITH LONG-TERM CURRENT USE OF INSULIN: ICD-10-CM

## 2022-03-10 DIAGNOSIS — E53.8 B12 DEFICIENCY: ICD-10-CM

## 2022-03-10 DIAGNOSIS — E83.42 HYPOMAGNESEMIA: ICD-10-CM

## 2022-03-10 PROCEDURE — 99214 OFFICE O/P EST MOD 30 MIN: CPT | Performed by: INTERNAL MEDICINE

## 2022-03-10 RX ORDER — OMEPRAZOLE 20 MG/1
20 CAPSULE, DELAYED RELEASE ORAL DAILY
Qty: 90 CAPSULE | Refills: 3 | Status: SHIPPED | OUTPATIENT
Start: 2022-03-10 | End: 2023-03-03 | Stop reason: SDUPTHER

## 2022-03-10 NOTE — ASSESSMENT & PLAN NOTE
Patient is morbidly obese with BMI of 52.1.  Assessment: Morbidly obese.  Plan: Would be best if she could lose at least even a modest amount of weight.

## 2022-03-10 NOTE — ASSESSMENT & PLAN NOTE
Patient stays on Prilosec 20 mg daily.  She follows with Dr. Donohue's nurse practitioner.  She had a EGD about 3 years ago and had dilatation.  No dysphagia.  She tried famotidine OTC but had too much symptomatology.  She went back on Prilosec.  She was able to reduce the dose of Prilosec from 40 mg a day to 20 mg a day.  Assessment: Hiatal hernia GERD reflux.  Last EGD was 2019.

## 2022-03-10 NOTE — ASSESSMENT & PLAN NOTE
Systolic blood pressure at home ranges from 120-130 systolic.  She remains on HCTZ 12.5 mg daily.  Assessment: Mild hypertension.  Plan: Continue HCTZ 12.5 mg daily.  Get labs tomorrow.  RTO in 6 months.

## 2022-03-10 NOTE — PROGRESS NOTES
"Chief Complaint  Follow-up (Patient is going tomorrow to get labs done, with her work schedule she couldn't get them done prior to her visit. /She has no new issues)    Subjective  Patient has been doing well.  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  She has been testing people for flu and Covid.  She has had all 3 Covid vaccinations.  Had her flu vaccine.  Is getting Shingrix vaccination.  She is due for Tdap.  Get that every 10 years and we discussed.    Serenity Fleming presents to [facility]  History of Present Illness  51-year-old lady who used to work at [employer].  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  Hypertension.  Morbid obesity.  Esophageal reflux.  Needs repeat colonoscopy next year.  She thinks that Dr. Donohue's office will schedule that.  Mild asthma that has not been a problem for her recently.  Objective   Vital Signs:   /80   Pulse 72   Temp 97.3 °F (36.3 °C)   Ht 160 cm (62.99\")   Wt 133 kg (294 lb 3.2 oz)   SpO2 98%   BMI 52.13 kg/m²     Physical Exam  Vitals and nursing note reviewed.   Constitutional:       Appearance: Normal appearance. She is well-developed and well-groomed. She is morbidly obese.   HENT:      Head: Normocephalic.   Eyes:      Extraocular Movements: Extraocular movements intact.      Conjunctiva/sclera: Conjunctivae normal.   Cardiovascular:      Rate and Rhythm: Normal rate and regular rhythm.      Heart sounds: Normal heart sounds. No murmur heard.  Pulmonary:      Effort: Pulmonary effort is normal.      Breath sounds: Normal breath sounds. No wheezing or rales.   Abdominal:      General: Bowel sounds are normal.      Palpations: Abdomen is soft.      Tenderness: There is no abdominal tenderness. There is no guarding.   Musculoskeletal:         General: No swelling. Normal range of motion.   Skin:     General: Skin is warm and dry.   Neurological:      General: No focal deficit " present.      Mental Status: She is alert and oriented to person, place, and time. Mental status is at baseline.   Psychiatric:         Mood and Affect: Mood normal.         Behavior: Behavior is cooperative.         Thought Content: Thought content normal.        Result Review :  The following data was reviewed by: Stefanie Thomas MD on 03/10/2022:                             No labs done on this visit     Assessment and Plan   Diagnoses and all orders for this visit:    1. Screening due (Primary)    2. Fatigue, unspecified type    3. Type 2 diabetes mellitus with hyperglycemia, with long-term current use of insulin (HCC)    4. Iron deficiency anemia, unspecified iron deficiency anemia type    5. Vitamin D deficiency    6. B12 deficiency    7. Hypomagnesemia    8. Hyperlipidemia, unspecified hyperlipidemia type    9. Essential hypertension  Assessment & Plan:  Systolic blood pressure at home ranges from 120-130 systolic.  She remains on HCTZ 12.5 mg daily.  Assessment: Mild hypertension.  Plan: Continue HCTZ 12.5 mg daily.  Get labs tomorrow.  RTO in 6 months.      10. Gastroesophageal reflux disease without esophagitis  Assessment & Plan:  Patient stays on Prilosec 20 mg daily.  She follows with Dr. Donohue's nurse practitioner.  She had a EGD about 3 years ago and had dilatation.  No dysphagia.  She tried famotidine OTC but had too much symptomatology.  She went back on Prilosec.  She was able to reduce the dose of Prilosec from 40 mg a day to 20 mg a day.  Assessment: Hiatal hernia GERD reflux.  Last EGD was 2019.      11. Morbid (severe) obesity due to excess calories (HCC)  Assessment & Plan:  Patient is morbidly obese with BMI of 52.1.  Assessment: Morbidly obese.  Plan: Would be best if she could lose at least even a modest amount of weight.            Follow Up No follow-ups on file.  Patient was given instructions and counseling regarding her condition or for health maintenance advice. Please see  specific information pulled into the AVS if appropriate.       Answers for HPI/ROS submitted by the patient on 3/8/2022  What is the primary reason for your visit?: High Blood Pressure

## 2022-03-11 ENCOUNTER — LAB (OUTPATIENT)
Dept: LAB | Facility: HOSPITAL | Age: 52
End: 2022-03-11

## 2022-03-11 DIAGNOSIS — E78.5 HYPERLIPIDEMIA, UNSPECIFIED HYPERLIPIDEMIA TYPE: ICD-10-CM

## 2022-03-11 DIAGNOSIS — Z79.4 TYPE 2 DIABETES MELLITUS WITH HYPERGLYCEMIA, WITH LONG-TERM CURRENT USE OF INSULIN: ICD-10-CM

## 2022-03-11 DIAGNOSIS — E11.65 TYPE 2 DIABETES MELLITUS WITH HYPERGLYCEMIA, WITH LONG-TERM CURRENT USE OF INSULIN: ICD-10-CM

## 2022-03-11 DIAGNOSIS — R53.83 FATIGUE, UNSPECIFIED TYPE: ICD-10-CM

## 2022-03-11 DIAGNOSIS — E83.42 HYPOMAGNESEMIA: ICD-10-CM

## 2022-03-11 DIAGNOSIS — E55.9 VITAMIN D DEFICIENCY: ICD-10-CM

## 2022-03-11 DIAGNOSIS — D50.9 IRON DEFICIENCY ANEMIA, UNSPECIFIED IRON DEFICIENCY ANEMIA TYPE: ICD-10-CM

## 2022-03-11 DIAGNOSIS — E53.8 B12 DEFICIENCY: ICD-10-CM

## 2022-03-11 LAB
25(OH)D3 SERPL-MCNC: 17.5 NG/ML (ref 30–100)
ALBUMIN SERPL-MCNC: 4.2 G/DL (ref 3.5–5.2)
ALBUMIN/GLOB SERPL: 1.3 G/DL
ALP SERPL-CCNC: 84 U/L (ref 39–117)
ALT SERPL W P-5'-P-CCNC: 36 U/L (ref 1–33)
ANION GAP SERPL CALCULATED.3IONS-SCNC: 11.1 MMOL/L (ref 5–15)
AST SERPL-CCNC: 30 U/L (ref 1–32)
BASOPHILS # BLD AUTO: 0.05 10*3/MM3 (ref 0–0.2)
BASOPHILS NFR BLD AUTO: 0.6 % (ref 0–1.5)
BILIRUB SERPL-MCNC: 0.5 MG/DL (ref 0–1.2)
BUN SERPL-MCNC: 7 MG/DL (ref 6–20)
BUN/CREAT SERPL: 8.6 (ref 7–25)
CALCIUM SPEC-SCNC: 9.3 MG/DL (ref 8.6–10.5)
CHLORIDE SERPL-SCNC: 101 MMOL/L (ref 98–107)
CHOLEST SERPL-MCNC: 194 MG/DL (ref 0–200)
CO2 SERPL-SCNC: 25.9 MMOL/L (ref 22–29)
CREAT SERPL-MCNC: 0.81 MG/DL (ref 0.57–1)
DEPRECATED RDW RBC AUTO: 38.4 FL (ref 37–54)
EGFRCR SERPLBLD CKD-EPI 2021: 86.9 ML/MIN/1.73
EOSINOPHIL # BLD AUTO: 0.14 10*3/MM3 (ref 0–0.4)
EOSINOPHIL NFR BLD AUTO: 1.8 % (ref 0.3–6.2)
ERYTHROCYTE [DISTWIDTH] IN BLOOD BY AUTOMATED COUNT: 12.9 % (ref 12.3–15.4)
FERRITIN SERPL-MCNC: 105 NG/ML (ref 13–150)
GLOBULIN UR ELPH-MCNC: 3.3 GM/DL
GLUCOSE SERPL-MCNC: 115 MG/DL (ref 65–99)
HBA1C MFR BLD: 7.3 % (ref 4.8–5.6)
HCT VFR BLD AUTO: 41.4 % (ref 34–46.6)
HDLC SERPL-MCNC: 53 MG/DL (ref 40–60)
HGB BLD-MCNC: 14.1 G/DL (ref 12–15.9)
IMM GRANULOCYTES # BLD AUTO: 0.01 10*3/MM3 (ref 0–0.05)
IMM GRANULOCYTES NFR BLD AUTO: 0.1 % (ref 0–0.5)
IRON 24H UR-MRATE: 64 MCG/DL (ref 37–145)
LDLC SERPL CALC-MCNC: 122 MG/DL (ref 0–100)
LDLC/HDLC SERPL: 2.26 {RATIO}
LYMPHOCYTES # BLD AUTO: 1.78 10*3/MM3 (ref 0.7–3.1)
LYMPHOCYTES NFR BLD AUTO: 22.8 % (ref 19.6–45.3)
MAGNESIUM SERPL-MCNC: 2 MG/DL (ref 1.6–2.6)
MCH RBC QN AUTO: 28.4 PG (ref 26.6–33)
MCHC RBC AUTO-ENTMCNC: 34.1 G/DL (ref 31.5–35.7)
MCV RBC AUTO: 83.5 FL (ref 79–97)
MONOCYTES # BLD AUTO: 0.51 10*3/MM3 (ref 0.1–0.9)
MONOCYTES NFR BLD AUTO: 6.5 % (ref 5–12)
NEUTROPHILS NFR BLD AUTO: 5.33 10*3/MM3 (ref 1.7–7)
NEUTROPHILS NFR BLD AUTO: 68.2 % (ref 42.7–76)
NRBC BLD AUTO-RTO: 0.1 /100 WBC (ref 0–0.2)
PLATELET # BLD AUTO: 298 10*3/MM3 (ref 140–450)
PMV BLD AUTO: 11.1 FL (ref 6–12)
POTASSIUM SERPL-SCNC: 4 MMOL/L (ref 3.5–5.2)
PROT SERPL-MCNC: 7.5 G/DL (ref 6–8.5)
RBC # BLD AUTO: 4.96 10*6/MM3 (ref 3.77–5.28)
SODIUM SERPL-SCNC: 138 MMOL/L (ref 136–145)
TRIGL SERPL-MCNC: 107 MG/DL (ref 0–150)
VIT B12 BLD-MCNC: 309 PG/ML (ref 211–946)
VLDLC SERPL-MCNC: 19 MG/DL (ref 5–40)
WBC NRBC COR # BLD: 7.82 10*3/MM3 (ref 3.4–10.8)

## 2022-03-11 PROCEDURE — 80061 LIPID PANEL: CPT

## 2022-03-11 PROCEDURE — 80053 COMPREHEN METABOLIC PANEL: CPT

## 2022-03-11 PROCEDURE — 85025 COMPLETE CBC W/AUTO DIFF WBC: CPT

## 2022-03-11 PROCEDURE — 82607 VITAMIN B-12: CPT

## 2022-03-11 PROCEDURE — 82728 ASSAY OF FERRITIN: CPT

## 2022-03-11 PROCEDURE — 83540 ASSAY OF IRON: CPT

## 2022-03-11 PROCEDURE — 83036 HEMOGLOBIN GLYCOSYLATED A1C: CPT

## 2022-03-11 PROCEDURE — 36415 COLL VENOUS BLD VENIPUNCTURE: CPT

## 2022-03-11 PROCEDURE — 82306 VITAMIN D 25 HYDROXY: CPT

## 2022-03-11 PROCEDURE — 83735 ASSAY OF MAGNESIUM: CPT

## 2022-09-08 ENCOUNTER — TELEPHONE (OUTPATIENT)
Dept: OBSTETRICS AND GYNECOLOGY | Age: 52
End: 2022-09-08

## 2022-09-08 RX ORDER — HYDROCHLOROTHIAZIDE 12.5 MG/1
12.5 CAPSULE, GELATIN COATED ORAL DAILY
Qty: 90 CAPSULE | Refills: 0 | Status: SHIPPED | OUTPATIENT
Start: 2022-09-08 | End: 2022-09-13 | Stop reason: SDUPTHER

## 2022-09-08 NOTE — TELEPHONE ENCOUNTER
Caller: BernadetteSerenity    Relationship: Self    Best call back number: [phone]    Requested Prescriptions:   Requested Prescriptions     Pending Prescriptions Disp Refills   • hydroCHLOROthiazide (MICROZIDE) 12.5 MG capsule 90 capsule 3     Sig: Take 1 capsule by mouth Daily.      Pharmacy where request should be sent: [facility]ACY #[id number] - [place], [place] - [address] [facility] [phone] [facility] [phone] FX     Does the patient have less than a 3 day supply:  [x] Yes  [] No    Ryan Fritz Rep   09/08/22 15:25 EDT

## 2022-09-08 NOTE — TELEPHONE ENCOUNTER
Caller: Serenity Fleming    Relationship to patient: Self    Best call back number: [phone]    Chief complaint:     Type of visit: ANNUAL AND MAMMOGRAM    Requested date:    If rescheduling, when is the original appointment: 9/30/22    Additional notes: APPT CANCELLED DUE TO PROVIDER AVAILABILITY. UNABLE TO SCHEDULE PATIENT WITH DR. DAMON AND MAMMOGRAM. PT WOULD PREFER DR. DAMON BUT WILL SEE NP AS A LAST RESORT. WANTS TO BE SEEN THIS YEAR. OKAY TO LVM. SCHOOL NURSE TRY TO CALL IN THE AFTERNOON.

## 2022-09-09 ENCOUNTER — TELEPHONE (OUTPATIENT)
Dept: OBSTETRICS AND GYNECOLOGY | Age: 52
End: 2022-09-09

## 2022-09-09 NOTE — TELEPHONE ENCOUNTER
Placed call to patient to off new appointment date. No answer at this time. Left message for patient to return call. Scheduled Annual for 11/14/22, If she is not able to do that appointment it can be moved to 11/28/22 @3:30. If that does not work for her schedule she can schedule with NP or PA.

## 2022-09-11 NOTE — PROGRESS NOTES
Chief Complaint  Establish Care (New patient, the patient has no new concerns. )  Subjective        History of Present Illness  Serenity Fleming presents to [facility] for:     1.  Her annual physical exam.  Patient has been a patient of Dr. Arboleda.  Dr. Yumiko Vail is a gynecologist and she gets well woman checks there. She had her flu vaccine today at her work.    2.  Patient has history of diabetes type 2, hypertension, hyperlipidemia (elevated LDL), morbid obesity,  iron deficiency anemia, vitamin D and B-12 deficiencies.   Blood pressures at home have been 110-120s/70-80s. Follows with Dr. Donohue for GERD and hiatal hernia. 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    Current Outpatient Medications:   •  Cholecalciferol (vitamin D3) 125 MCG (5000 UT) capsule capsule, Take 4,000 Units by mouth Daily., Disp: , Rfl:   •  hydroCHLOROthiazide (MICROZIDE) 12.5 MG capsule, Take 1 capsule by mouth Daily., Disp: 90 capsule, Rfl: 1  •  Multiple Vitamins-Minerals (MULTIVITAMIN ADULT PO), Take  by mouth., Disp: , Rfl:   •  omeprazole (priLOSEC) 20 MG capsule, Take 1 capsule by mouth Daily., Disp: 90 capsule, Rfl: 3  •  Semaglutide,0.25 or 0.5MG/DOS, (OZEMPIC) 2 MG/1.5ML solution pen-injector, Inject 0.5 mg under the skin into the appropriate area as directed 1 (One) Time Per Week., Disp: 1.5 mL, Rfl: 5  Allergies   Allergen Reactions   • Codeine Shortness Of Breath      Past Medical History:   Diagnosis Date   • Acid reflux    • Anemia    • B12 deficiency 9/13/2022   • Claustrophobia    • Colon polyps    • Condition not found 01/08/2016    right shoulder scope aftercare following surgery of the musuloskeletal system   • Essential hypertension    • GERD (gastroesophageal reflux disease)    • Heavy menses    • Hyperlipidemia 9/13/2022   • Hypertension    • PCOS (polycystic ovarian syndrome) 9/13/2022   • Type 2 diabetes mellitus without complication, without long-term current  "use of insulin (HCC) 2022   • Vitamin D deficiency 2022      Past Surgical History:   Procedure Laterality Date   • ADENOIDECTOMY     •  SECTION     • CHOLECYSTECTOMY     • DILATATION AND CURETTAGE     • ENDOMETRIAL ABLATION     • ENDOSCOPY     • GALLBLADDER SURGERY     • KNEE CARTILAGE SURGERY     • MENISCECTOMY Right     meniscus tear   • OTHER SURGICAL HISTORY      surgical clips    • OTHER SURGICAL HISTORY      uterine ablation    • ROTATOR CUFF REPAIR     • TONSILLECTOMY        Objective   /80 (BP Location: Left arm, Patient Position: Sitting, Cuff Size: Large Adult)   Pulse 89   Temp 97.4 °F (36.3 °C) (Temporal)   Ht 160 cm (62.99\")   Wt 135 kg (297 lb 9.6 oz)   SpO2 97%   BMI 52.73 kg/m²    Estimated body mass index is 52.73 kg/m² as calculated from the following:    Height as of this encounter: 160 cm (62.99\").    Weight as of this encounter: 135 kg (297 lb 9.6 oz).   Physical Exam  Vitals reviewed.   Constitutional:       General: She is not in acute distress.     Appearance: She is obese.   HENT:      Head: Normocephalic and atraumatic.   Eyes:      Conjunctiva/sclera: Conjunctivae normal.   Cardiovascular:      Rate and Rhythm: Normal rate and regular rhythm.      Heart sounds: Normal heart sounds. No murmur heard.  Pulmonary:      Effort: Pulmonary effort is normal.      Breath sounds: Normal breath sounds. No wheezing, rhonchi or rales.   Abdominal:      General: There is no distension.      Palpations: Abdomen is soft. There is no mass.      Tenderness: There is no abdominal tenderness.   Musculoskeletal:      Right lower leg: No edema.      Left lower leg: No edema.   Lymphadenopathy:      Cervical: No cervical adenopathy.   Skin:     General: Skin is warm and dry.   Neurological:      General: No focal deficit present.      Mental Status: She is alert.   Psychiatric:         Mood and Affect: Mood normal.         Thought Content: Thought content normal.      "   Result Review :   The following data was reviewed by: JAMESON Salcido on 09/13/2022:                Assessment and Plan    Diagnoses and all orders for this visit:    1. Annual physical exam (Primary)  Comments:  Discussed healthy diet, exercise, adequate sleep, cancer screening, immunizations, and preventative care. Annual eye exam and twice yearly dental cleaning.  Orders:  -     Comprehensive Metabolic Panel; Future  -     CBC & Differential; Future  -     Lipid Panel; Future  -     T4, Free; Future  -     TSH; Future    2. Type 2 diabetes mellitus without complication, without long-term current use of insulin (HCC)  Comments:  Last HA1C 7.3.  The patient is also trying to lose weight.Start sample Ozempic 0.25 mg weekly x4 then increase to 0.5 mg weekly.  RTC 3 months and labs prior.  Orders:  -     Hemoglobin A1c; Future    3. Essential hypertension  Comments:  Blood pressures good at home.  Stable on HCTZ 12.5 mg daily and to continue.    4. Hyperlipidemia, unspecified hyperlipidemia type  Comments:  .  Discussed diet.  No medication at this time we will try weight loss to lower number.    5. Vitamin D deficiency  Comments:  Patient is taking vitamin D3 4000 units daily.  Check level in 3 months.  Orders:  -     Vitamin D 25 Hydroxy; Future    6. Iron deficiency anemia, unspecified iron deficiency anemia type  -     Iron Profile; Future    7. B12 deficiency  -     Vitamin B12; Future  -     Folate; Future    8. Need for hepatitis C screening test  -     Hepatitis C Antibody; Future    Other orders  -     Semaglutide,0.25 or 0.5MG/DOS, (OZEMPIC) 2 MG/1.5ML solution pen-injector; Inject 0.5 mg under the skin into the appropriate area as directed 1 (One) Time Per Week.  Dispense: 1.5 mL; Refill: 5  -     hydroCHLOROthiazide (MICROZIDE) 12.5 MG capsule; Take 1 capsule by mouth Daily.  Dispense: 90 capsule; Refill: 1             Follow Up     Patient was given instructions and counseling regarding her  condition or for health maintenance advice. Please see specific information pulled into the AVS if appropriate.   Return in about 3 months (around 12/13/2022) for Recheck.    JAMESON Salcido

## 2022-09-13 ENCOUNTER — TELEPHONE (OUTPATIENT)
Dept: INTERNAL MEDICINE | Facility: CLINIC | Age: 52
End: 2022-09-13

## 2022-09-13 ENCOUNTER — OFFICE VISIT (OUTPATIENT)
Dept: INTERNAL MEDICINE | Facility: CLINIC | Age: 52
End: 2022-09-13

## 2022-09-13 VITALS
DIASTOLIC BLOOD PRESSURE: 80 MMHG | HEART RATE: 89 BPM | SYSTOLIC BLOOD PRESSURE: 150 MMHG | WEIGHT: 293 LBS | HEIGHT: 63 IN | BODY MASS INDEX: 51.91 KG/M2 | OXYGEN SATURATION: 97 % | TEMPERATURE: 97.4 F

## 2022-09-13 DIAGNOSIS — E78.5 HYPERLIPIDEMIA, UNSPECIFIED HYPERLIPIDEMIA TYPE: Chronic | ICD-10-CM

## 2022-09-13 DIAGNOSIS — I10 ESSENTIAL HYPERTENSION: Primary | ICD-10-CM

## 2022-09-13 DIAGNOSIS — E11.65 TYPE 2 DIABETES MELLITUS WITH HYPERGLYCEMIA, WITH LONG-TERM CURRENT USE OF INSULIN: ICD-10-CM

## 2022-09-13 DIAGNOSIS — E55.9 VITAMIN D DEFICIENCY: ICD-10-CM

## 2022-09-13 DIAGNOSIS — E53.8 B12 DEFICIENCY: ICD-10-CM

## 2022-09-13 DIAGNOSIS — D50.9 IRON DEFICIENCY ANEMIA, UNSPECIFIED IRON DEFICIENCY ANEMIA TYPE: ICD-10-CM

## 2022-09-13 DIAGNOSIS — Z11.59 NEED FOR HEPATITIS C SCREENING TEST: ICD-10-CM

## 2022-09-13 DIAGNOSIS — E78.5 HYPERLIPIDEMIA, UNSPECIFIED HYPERLIPIDEMIA TYPE: ICD-10-CM

## 2022-09-13 DIAGNOSIS — Z79.4 TYPE 2 DIABETES MELLITUS WITH HYPERGLYCEMIA, WITH LONG-TERM CURRENT USE OF INSULIN: ICD-10-CM

## 2022-09-13 DIAGNOSIS — Z00.00 ANNUAL PHYSICAL EXAM: Primary | ICD-10-CM

## 2022-09-13 DIAGNOSIS — R53.83 FATIGUE, UNSPECIFIED TYPE: ICD-10-CM

## 2022-09-13 DIAGNOSIS — E83.42 HYPOMAGNESEMIA: ICD-10-CM

## 2022-09-13 DIAGNOSIS — E11.9 TYPE 2 DIABETES MELLITUS WITHOUT COMPLICATION, WITHOUT LONG-TERM CURRENT USE OF INSULIN: Chronic | ICD-10-CM

## 2022-09-13 DIAGNOSIS — I10 ESSENTIAL HYPERTENSION: Chronic | ICD-10-CM

## 2022-09-13 PROBLEM — E28.2 PCOS (POLYCYSTIC OVARIAN SYNDROME): Status: ACTIVE | Noted: 2022-09-13

## 2022-09-13 PROBLEM — E11.43 TYPE 2 DIABETES MELLITUS WITH AUTONOMIC NEUROPATHY (HCC): Status: ACTIVE | Noted: 2022-09-13

## 2022-09-13 PROBLEM — E11.43 TYPE 2 DIABETES MELLITUS WITH DIABETIC AUTONOMIC NEUROPATHY, WITHOUT LONG-TERM CURRENT USE OF INSULIN: Status: ACTIVE | Noted: 2022-09-13

## 2022-09-13 PROBLEM — J45.909 ASTHMA: Status: RESOLVED | Noted: 2022-02-02 | Resolved: 2022-09-13

## 2022-09-13 PROCEDURE — 99214 OFFICE O/P EST MOD 30 MIN: CPT | Performed by: NURSE PRACTITIONER

## 2022-09-13 PROCEDURE — 99396 PREV VISIT EST AGE 40-64: CPT | Performed by: NURSE PRACTITIONER

## 2022-09-13 RX ORDER — HYDROCHLOROTHIAZIDE 12.5 MG/1
12.5 CAPSULE, GELATIN COATED ORAL DAILY
Qty: 90 CAPSULE | Refills: 1 | Status: SHIPPED | OUTPATIENT
Start: 2022-09-13 | End: 2022-12-06 | Stop reason: SDUPTHER

## 2022-09-13 NOTE — PATIENT INSTRUCTIONS
"Diabetes Care, 44(Suppl 1), S34-S39. https://doi.org/https://doi.org/10.2337/jk32-J494\">   Prediabetes  Prediabetes is when your blood sugar (blood glucose) level is higher than normal but not high enough for you to be diagnosed with type 2 diabetes. Having prediabetes puts you at risk for developing type 2 diabetes (type 2 diabetes mellitus).  With certain lifestyle changes, you may be able to prevent or delay the onset of type 2 diabetes. This is important because type 2 diabetes can lead to serious complications, such as:  Heart disease.  Stroke.  Blindness.  Kidney disease.  Depression.  Poor circulation in the feet and legs. In severe cases, this could lead to surgical removal of a leg (amputation).  What are the causes?  The exact cause of prediabetes is not known. It may result from insulin resistance. Insulin resistance develops when cells in the body do not respond properly to insulin that the body makes. This can cause excess glucose to build up in the blood. High blood glucose (hyperglycemia) can develop.  What increases the risk?  The following factors may make you more likely to develop this condition:  You have a family member with type 2 diabetes.  You are older than 45 years.  You had a temporary form of diabetes during a pregnancy (gestational diabetes).  You had polycystic ovary syndrome (PCOS).  You are overweight or obese.  You are inactive (sedentary).  You have a history of heart disease, including problems with cholesterol levels, high levels of blood fats, or high blood pressure.  What are the signs or symptoms?  You may have no symptoms. If you do have symptoms, they may include:  Increased hunger.  Increased thirst.  Increased urination.  Vision changes, such as blurry vision.  Tiredness (fatigue).  How is this diagnosed?  This condition can be diagnosed with blood tests. Your blood glucose may be checked with one or more of the following tests:  A fasting blood glucose (FBG) test. You will " not be allowed to eat (you will fast) for at least 8 hours before a blood sample is taken.  An A1C blood test (hemoglobin A1C). This test provides information about blood glucose levels over the previous 2?3 months.  An oral glucose tolerance test (OGTT). This test measures your blood glucose at two points in time:  After fasting. This is your baseline level.  Two hours after you drink a beverage that contains glucose.  You may be diagnosed with prediabetes if:  Your FBG is 100?125 mg/dL (5.6-6.9 mmol/L).  Your A1C level is 5.7?6.4% (39-46 mmol/mol).  Your OGTT result is 140?199 mg/dL (7.8-11 mmol/L).  These blood tests may be repeated to confirm your diagnosis.  How is this treated?  Treatment may include dietary and lifestyle changes to help lower your blood glucose and prevent type 2 diabetes from developing. In some cases, medicine may be prescribed to help lower the risk of type 2 diabetes.  Follow these instructions at home:  Nutrition    Follow a healthy meal plan. This includes eating lean proteins, whole grains, legumes, fresh fruits and vegetables, low-fat dairy products, and healthy fats.  Follow instructions from your health care provider about eating or drinking restrictions.  Meet with a dietitian to create a healthy eating plan that is right for you.    Lifestyle  Do moderate-intensity exercise for at least 30 minutes a day on 5 or more days each week, or as told by your health care provider. A mix of activities may be best, such as:  Brisk walking, swimming, biking, and weight lifting.  Lose weight as told by your health care provider. Losing 5-7% of your body weight can reverse insulin resistance.  Do not drink alcohol if:  Your health care provider tells you not to drink.  You are pregnant, may be pregnant, or are planning to become pregnant.  If you drink alcohol:  Limit how much you use to:  0-1 drink a day for women.  0-2 drinks a day for men.  Be aware of how much alcohol is in your drink. In  the [place] one drink equals one 12 oz bottle of beer (355 mL), one 5 oz glass of wine (148 mL), or one 1½ oz glass of hard liquor (44 mL).  General instructions  Take over-the-counter and prescription medicines only as told by your health care provider. You may be prescribed medicines that help lower the risk of type 2 diabetes.  Do not use any products that contain nicotine or tobacco, such as cigarettes, e-cigarettes, and chewing tobacco. If you need help quitting, ask your health care provider.  Keep all follow-up visits. This is important.  Where to find more information  American Diabetes Association: www.diabetes.org  Academy of Nutrition and Dietetics: www.eatright.org  American Heart Association: www.heart.org  Contact a health care provider if:  You have any of these symptoms:  Increased hunger.  Increased urination.  Increased thirst.  Fatigue.  Vision changes, such as blurry vision.  Get help right away if you:  Have shortness of breath.  Feel confused.  Vomit or feel like you may vomit.  Summary  Prediabetes is when your blood sugar (blood glucose)level is higher than normal but not high enough for you to be diagnosed with type 2 diabetes.  Having prediabetes puts you at risk for developing type 2 diabetes (type 2 diabetes mellitus).  Make lifestyle changes such as eating a healthy diet and exercising regularly to help prevent diabetes. Lose weight as told by your health care provider.  This information is not intended to replace advice given to you by your health care provider. Make sure you discuss any questions you have with your health care provider.  Document Revised: 03/18/2021 Document Reviewed: 03/18/2021  Elsevier Patient Education © 2021 Elsevier Inc.

## 2022-11-14 ENCOUNTER — OFFICE VISIT (OUTPATIENT)
Dept: OBSTETRICS AND GYNECOLOGY | Age: 52
End: 2022-11-14

## 2022-11-14 VITALS
BODY MASS INDEX: 51.91 KG/M2 | WEIGHT: 293 LBS | DIASTOLIC BLOOD PRESSURE: 90 MMHG | HEIGHT: 63 IN | SYSTOLIC BLOOD PRESSURE: 154 MMHG

## 2022-11-14 DIAGNOSIS — Z01.419 ENCOUNTER FOR GYNECOLOGICAL EXAMINATION WITHOUT ABNORMAL FINDING: Primary | ICD-10-CM

## 2022-11-14 PROCEDURE — 99396 PREV VISIT EST AGE 40-64: CPT | Performed by: OBSTETRICS & GYNECOLOGY

## 2022-11-14 RX ORDER — NYSTATIN 100000 [USP'U]/G
POWDER TOPICAL 2 TIMES DAILY
Qty: 60 G | Refills: 3 | Status: SHIPPED | OUTPATIENT
Start: 2022-11-14

## 2022-11-14 RX ORDER — NYSTATIN 100000 U/G
1 CREAM TOPICAL AS NEEDED
Qty: 60 G | Refills: 3 | Status: SHIPPED | OUTPATIENT
Start: 2022-11-14

## 2022-11-14 NOTE — PROGRESS NOTES
Routine Annual Visit    2022    Patient: Serenity Fleming          MR#:[id number]      Chief Complaint   Patient presents with   • Gynecologic Exam     Annual:Last pap ,mammo ,appt.,colonoscopy        History of Present Illness    52 y.o. female  who presents for annual exam.     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 Patient has had an ablation.  Obstetric History:  OB History        1    Para   0    Term   0            AB        Living           SAB        IAB        Ectopic        Molar        Multiple        Live Births                   Menstrual History:     No LMP recorded. Patient has had an ablation.       Sexual History:       ________________________________________  Patient Active Problem List   Diagnosis   • Anemia   • Colon polyps   • Esophageal reflux   • Essential hypertension   • Morbid (severe) obesity due to excess calories (HCC)   • PCOS (polycystic ovarian syndrome)   • Type 2 diabetes mellitus without complication, without long-term current use of insulin ([facility])   • Hyperlipidemia   • Vitamin D deficiency   • B12 deficiency       Past Medical History:   Diagnosis Date   • Acid reflux    • Anemia    • B12 deficiency 2022   • Claustrophobia    • Colon polyps    • Condition not found 2016    right shoulder scope aftercare following surgery of the musuloskeletal system   • Essential hypertension    • GERD (gastroesophageal reflux disease)    • Heavy menses    • Hyperlipidemia 2022   • Hypertension    • PCOS (polycystic ovarian syndrome) 2022   • Type 2 diabetes mellitus without complication, without long-term current use of insulin (HCC) 2022   • Vitamin D deficiency 2022       Past Surgical History:   Procedure  "Laterality Date   • ADENOIDECTOMY     •  SECTION     • CHOLECYSTECTOMY     • DILATATION AND CURETTAGE     • ENDOMETRIAL ABLATION     • ENDOSCOPY     • GALLBLADDER SURGERY     • KNEE CARTILAGE SURGERY     • MENISCECTOMY Right     meniscus tear   • OTHER SURGICAL HISTORY      surgical clips    • OTHER SURGICAL HISTORY      uterine ablation    • ROTATOR CUFF REPAIR     • TONSILLECTOMY         Social History     Tobacco Use   Smoking Status Never   Smokeless Tobacco Never       has a current medication list which includes the following prescription(s): vitamin d3, hydrochlorothiazide, multiple vitamin, omeprazole, semaglutide(0.25 or 0.5mg/dos), nystatin, and nystatin.  ________________________________________    Current contraception: post menopausal status  History of abnormal Pap smear: no  Family history of Breast cancer: yes  Family history of uterine or ovarian cancer: no  Family History of colon cancer/colon polyps: no  History of abnormal mammogram: no      The following portions of the patient's history were reviewed and updated as appropriate: allergies, current medications, past family history, past medical history, past social history, past surgical history and problem list.    Review of Systems    Pertinent items are noted in HPI.     Objective   Physical Exam    /90   Ht 160 cm (63\")   Wt 134 kg (295 lb)   BMI 52.26 kg/m²    BP Readings from Last 3 Encounters:   22 154/90   22 150/80   03/10/22 140/80      Wt Readings from Last 3 Encounters:   22 134 kg (295 lb)   22 135 kg (297 lb 9.6 oz)   03/10/22 133 kg (294 lb 3.2 oz)      BMI: Estimated body mass index is 52.26 kg/m² as calculated from the following:    Height as of this encounter: 160 cm (63\").    Weight as of this encounter: 134 kg (295 lb).      General:   alert, appears stated age and cooperative   Abdomen: soft, non-tender, without masses or organomegaly   Breast: inspection negative, no " nipple discharge or bleeding, no masses or nodularity palpable   Vulva: normal   Vagina: normal mucosa   Cervix: no cervical motion tenderness and no lesions   Uterus: normal size, mobile and non-tender   Adnexa: no mass, fullness, tenderness     Assessment:    1. Normal annual exam   Assessment     ICD-10-CM ICD-9-CM   1. Encounter for gynecological examination without abnormal finding  Z01.419 V72.31     Plan:    Plan     [x]  Mammogram request made  []  PAP done  []  Labs:   []  GC/Chl/TV  []  DEXA scan   []  Referral for colonoscopy:       Diagnoses and all orders for this visit:    1. Encounter for gynecological examination without abnormal finding (Primary)    Other orders  -     nystatin (MYCOSTATIN) 125339 UNIT/GM powder; Apply  topically to the appropriate area as directed 2 (Two) Times a Day.  Dispense: 60 g; Refill: 3  -     nystatin (MYCOSTATIN) 257696 UNIT/GM cream; Apply 1 application topically to the appropriate area as directed As Needed (skin yeast).  Dispense: 60 g; Refill: 3            Counseling:  --Nutrition: Stressed importance of moderation and caloric balance, stressed fresh fruit and vegetables  --Exercise: Stressed the importance of regular exercise. 3-5 times weekly   - Discussed screening mammogram recommendations.   --Discussed benefits of screening colonoscopy- age 45 unless FH  --Discussed pap smear screening recommendations

## 2022-12-06 ENCOUNTER — PROCEDURE VISIT (OUTPATIENT)
Dept: OBSTETRICS AND GYNECOLOGY | Age: 52
End: 2022-12-06

## 2022-12-06 ENCOUNTER — TELEPHONE (OUTPATIENT)
Dept: INTERNAL MEDICINE | Facility: CLINIC | Age: 52
End: 2022-12-06

## 2022-12-06 ENCOUNTER — APPOINTMENT (OUTPATIENT)
Dept: WOMENS IMAGING | Facility: HOSPITAL | Age: 52
End: 2022-12-06

## 2022-12-06 DIAGNOSIS — Z12.31 VISIT FOR SCREENING MAMMOGRAM: Primary | ICD-10-CM

## 2022-12-06 PROCEDURE — 77063 BREAST TOMOSYNTHESIS BI: CPT | Performed by: RADIOLOGY

## 2022-12-06 PROCEDURE — 77067 SCR MAMMO BI INCL CAD: CPT | Performed by: RADIOLOGY

## 2022-12-06 PROCEDURE — 77063 BREAST TOMOSYNTHESIS BI: CPT | Performed by: OBSTETRICS & GYNECOLOGY

## 2022-12-06 PROCEDURE — 77067 SCR MAMMO BI INCL CAD: CPT | Performed by: OBSTETRICS & GYNECOLOGY

## 2022-12-06 RX ORDER — HYDROCHLOROTHIAZIDE 12.5 MG/1
12.5 CAPSULE, GELATIN COATED ORAL DAILY
Qty: 90 CAPSULE | Refills: 1 | Status: SHIPPED | OUTPATIENT
Start: 2022-12-06

## 2022-12-06 NOTE — TELEPHONE ENCOUNTER
DELETE AFTER REVIEWING: Send the encounter HIGH priority, If patient has less than a 3 day supply. If the patient will run out of medication over the weekend add that information to the additional details line. Send this encounter to the clinical pool.    Caller: Serenity Fleming    Relationship: Self      Requested Prescriptions:   Requested Prescriptions     Pending Prescriptions Disp Refills   • hydroCHLOROthiazide (MICROZIDE) 12.5 MG capsule 90 capsule 1     Sig: Take 1 capsule by mouth Daily.        Pharmacy where request should be sent: [facility] #[id number] - [place], [place] - [address] [facility] [phone] [facility] [phone] FX         Does the patient have less than a 3 day supply:  [x] Yes  [] No    Would you like a call back once the refill request has been completed: [x] Yes [] No    If the office needs to give you a call back, can they leave a voicemail: [x] Yes [] No    Ryan Del Rosario   12/06/22 12:02 EST

## 2023-03-03 RX ORDER — OMEPRAZOLE 20 MG/1
20 CAPSULE, DELAYED RELEASE ORAL DAILY
Qty: 90 CAPSULE | Refills: 3 | Status: SHIPPED | OUTPATIENT
Start: 2023-03-03

## 2023-06-20 PROBLEM — Z00.00 ANNUAL PHYSICAL EXAM: Status: ACTIVE | Noted: 2023-06-20

## 2023-06-20 PROBLEM — K63.5 COLON POLYPS: Status: RESOLVED | Noted: 2022-02-02 | Resolved: 2023-06-20

## 2023-06-20 PROBLEM — E11.9 TYPE 2 DIABETES MELLITUS WITHOUT COMPLICATION, WITHOUT LONG-TERM CURRENT USE OF INSULIN: Chronic | Status: ACTIVE | Noted: 2022-09-13

## 2023-06-20 PROBLEM — K21.9 ESOPHAGEAL REFLUX: Chronic | Status: ACTIVE | Noted: 2022-02-02

## 2023-06-20 PROBLEM — E53.8 B12 DEFICIENCY: Chronic | Status: ACTIVE | Noted: 2022-09-13

## 2023-06-20 PROBLEM — E78.5 HYPERLIPIDEMIA: Chronic | Status: ACTIVE | Noted: 2022-09-13

## 2023-06-20 PROBLEM — I10 ESSENTIAL HYPERTENSION: Chronic | Status: ACTIVE | Noted: 2022-02-02

## 2023-06-20 PROBLEM — E66.01 MORBID (SEVERE) OBESITY DUE TO EXCESS CALORIES: Status: RESOLVED | Noted: 2022-03-10 | Resolved: 2023-06-20

## 2023-06-20 PROBLEM — E55.9 VITAMIN D DEFICIENCY: Chronic | Status: ACTIVE | Noted: 2022-09-13

## 2023-06-20 PROBLEM — D64.9 ANEMIA: Chronic | Status: ACTIVE | Noted: 2022-02-02

## 2023-08-22 DIAGNOSIS — Z12.31 ENCOUNTER FOR SCREENING MAMMOGRAM FOR MALIGNANT NEOPLASM OF BREAST: Primary | ICD-10-CM

## 2023-08-24 RX ORDER — HYDROCHLOROTHIAZIDE 12.5 MG/1
CAPSULE, GELATIN COATED ORAL
Qty: 90 CAPSULE | Refills: 1 | Status: SHIPPED | OUTPATIENT
Start: 2023-08-24

## 2023-08-24 RX ORDER — HYDROCHLOROTHIAZIDE 12.5 MG/1
CAPSULE, GELATIN COATED ORAL
Qty: 90 CAPSULE | Refills: 1 | OUTPATIENT
Start: 2023-08-24

## 2023-09-14 ENCOUNTER — TELEPHONE (OUTPATIENT)
Dept: FAMILY MEDICINE CLINIC | Facility: CLINIC | Age: 53
End: 2023-09-14
Payer: COMMERCIAL

## 2023-09-14 NOTE — TELEPHONE ENCOUNTER
Caller: Serenity Fleming    Relationship to patient: Self    Best call back number: [phone]    Patient is needing: PATIENT CALLED IN AND SAID THAT SHE IS CURRENTLY TAKING CIPRO AND HAS BEEN FOR 4 DAYS. PATIENT IS CALLING TO SEE IF IT WILL BE ALRIGHT FOR HER TO HAVE LABS DRAWN ON MONDAY AND IF SHE SHOULD KEEP HER APPOINTMENT ON NEXT THURSDAY SINCE SHE IS TAKING THAT MEDICATION. PATIENT SAID IT IS OKAY TO LEAVE MESSAGE ON PHONE

## 2023-09-15 ENCOUNTER — TELEPHONE (OUTPATIENT)
Dept: FAMILY MEDICINE CLINIC | Facility: CLINIC | Age: 53
End: 2023-09-15
Payer: COMMERCIAL

## 2023-09-15 NOTE — TELEPHONE ENCOUNTER
Caller: Serenity Fleming    Relationship to patient: Self    Best call back number: [phone]    Patient is needing: PATIENT STATED SHE HAS GOTTEN THIS SORTED OUT WITH THE OPTOMETRIST BUT THANK YOU FOR THE HELP. THIS HAS BEEN TAKEN CARE OF

## 2023-09-15 NOTE — TELEPHONE ENCOUNTER
Patient called stated she got put on ciprofloxacin for orbital cellulitis from optometrist. But it is making her extremly dizzy to the point she is scared to walk. Can't call optometrist because she is out of office.    Please advise

## 2023-09-15 NOTE — TELEPHONE ENCOUNTER
Spoke with patient, she is aware labs are ok to do while taking mediation.  She will come Monday and follow up on 9/21/23.

## 2023-09-18 ENCOUNTER — LAB (OUTPATIENT)
Dept: LAB | Facility: HOSPITAL | Age: 53
End: 2023-09-18
Payer: COMMERCIAL

## 2023-09-18 DIAGNOSIS — Z00.00 ANNUAL PHYSICAL EXAM: ICD-10-CM

## 2023-09-18 DIAGNOSIS — E53.8 B12 DEFICIENCY: Chronic | ICD-10-CM

## 2023-09-18 DIAGNOSIS — E55.9 VITAMIN D DEFICIENCY: Chronic | ICD-10-CM

## 2023-09-18 DIAGNOSIS — E78.2 MIXED HYPERLIPIDEMIA: Chronic | ICD-10-CM

## 2023-09-18 DIAGNOSIS — E11.9 TYPE 2 DIABETES MELLITUS WITHOUT COMPLICATION, WITHOUT LONG-TERM CURRENT USE OF INSULIN: Chronic | ICD-10-CM

## 2023-09-18 LAB
25(OH)D3 SERPL-MCNC: 24.7 NG/ML (ref 30–100)
ALBUMIN SERPL-MCNC: 4.3 G/DL (ref 3.5–5.2)
ALBUMIN UR-MCNC: 4.5 MG/DL
ALBUMIN/GLOB SERPL: 1.2 G/DL
ALP SERPL-CCNC: 90 U/L (ref 39–117)
ALT SERPL W P-5'-P-CCNC: 36 U/L (ref 1–33)
ANION GAP SERPL CALCULATED.3IONS-SCNC: 13.2 MMOL/L (ref 5–15)
AST SERPL-CCNC: 33 U/L (ref 1–32)
BASOPHILS # BLD AUTO: 0.05 10*3/MM3 (ref 0–0.2)
BASOPHILS NFR BLD AUTO: 0.7 % (ref 0–1.5)
BILIRUB SERPL-MCNC: 0.3 MG/DL (ref 0–1.2)
BUN SERPL-MCNC: 6 MG/DL (ref 6–20)
BUN/CREAT SERPL: 7.5 (ref 7–25)
CALCIUM SPEC-SCNC: 9.6 MG/DL (ref 8.6–10.5)
CHLORIDE SERPL-SCNC: 103 MMOL/L (ref 98–107)
CHOLEST SERPL-MCNC: 169 MG/DL (ref 0–200)
CO2 SERPL-SCNC: 25.8 MMOL/L (ref 22–29)
CREAT SERPL-MCNC: 0.8 MG/DL (ref 0.57–1)
CREAT UR-MCNC: 182 MG/DL
DEPRECATED RDW RBC AUTO: 41.5 FL (ref 37–54)
EGFRCR SERPLBLD CKD-EPI 2021: 87.1 ML/MIN/1.73
EOSINOPHIL # BLD AUTO: 0.23 10*3/MM3 (ref 0–0.4)
EOSINOPHIL NFR BLD AUTO: 3 % (ref 0.3–6.2)
ERYTHROCYTE [DISTWIDTH] IN BLOOD BY AUTOMATED COUNT: 13.3 % (ref 12.3–15.4)
FOLATE SERPL-MCNC: 6.96 NG/ML (ref 4.78–24.2)
GLOBULIN UR ELPH-MCNC: 3.5 GM/DL
GLUCOSE SERPL-MCNC: 95 MG/DL (ref 65–99)
HBA1C MFR BLD: 6.6 % (ref 4.8–5.6)
HCT VFR BLD AUTO: 43.2 % (ref 34–46.6)
HDLC SERPL-MCNC: 52 MG/DL (ref 40–60)
HGB BLD-MCNC: 14.5 G/DL (ref 12–15.9)
IMM GRANULOCYTES # BLD AUTO: 0.02 10*3/MM3 (ref 0–0.05)
IMM GRANULOCYTES NFR BLD AUTO: 0.3 % (ref 0–0.5)
LDLC SERPL CALC-MCNC: 102 MG/DL (ref 0–100)
LDLC/HDLC SERPL: 1.94 {RATIO}
LYMPHOCYTES # BLD AUTO: 1.87 10*3/MM3 (ref 0.7–3.1)
LYMPHOCYTES NFR BLD AUTO: 24.6 % (ref 19.6–45.3)
MCH RBC QN AUTO: 28.7 PG (ref 26.6–33)
MCHC RBC AUTO-ENTMCNC: 33.6 G/DL (ref 31.5–35.7)
MCV RBC AUTO: 85.5 FL (ref 79–97)
MICROALBUMIN/CREAT UR: 24.7 MG/G
MONOCYTES # BLD AUTO: 0.56 10*3/MM3 (ref 0.1–0.9)
MONOCYTES NFR BLD AUTO: 7.4 % (ref 5–12)
NEUTROPHILS NFR BLD AUTO: 4.86 10*3/MM3 (ref 1.7–7)
NEUTROPHILS NFR BLD AUTO: 64 % (ref 42.7–76)
NRBC BLD AUTO-RTO: 0 /100 WBC (ref 0–0.2)
PLATELET # BLD AUTO: 308 10*3/MM3 (ref 140–450)
PMV BLD AUTO: 10.9 FL (ref 6–12)
POTASSIUM SERPL-SCNC: 3.8 MMOL/L (ref 3.5–5.2)
PROT SERPL-MCNC: 7.8 G/DL (ref 6–8.5)
RBC # BLD AUTO: 5.05 10*6/MM3 (ref 3.77–5.28)
SODIUM SERPL-SCNC: 142 MMOL/L (ref 136–145)
TRIGL SERPL-MCNC: 81 MG/DL (ref 0–150)
TSH SERPL DL<=0.05 MIU/L-ACNC: 1.3 UIU/ML (ref 0.27–4.2)
VIT B12 BLD-MCNC: 305 PG/ML (ref 211–946)
VLDLC SERPL-MCNC: 15 MG/DL (ref 5–40)
WBC NRBC COR # BLD: 7.59 10*3/MM3 (ref 3.4–10.8)

## 2023-09-18 PROCEDURE — 83036 HEMOGLOBIN GLYCOSYLATED A1C: CPT

## 2023-09-18 PROCEDURE — 80061 LIPID PANEL: CPT

## 2023-09-18 PROCEDURE — 82043 UR ALBUMIN QUANTITATIVE: CPT

## 2023-09-18 PROCEDURE — 82746 ASSAY OF FOLIC ACID SERUM: CPT

## 2023-09-18 PROCEDURE — 80050 GENERAL HEALTH PANEL: CPT

## 2023-09-18 PROCEDURE — 36415 COLL VENOUS BLD VENIPUNCTURE: CPT

## 2023-09-18 PROCEDURE — 82607 VITAMIN B-12: CPT

## 2023-09-18 PROCEDURE — 82306 VITAMIN D 25 HYDROXY: CPT

## 2023-09-18 PROCEDURE — 82570 ASSAY OF URINE CREATININE: CPT

## 2023-09-19 RX ORDER — FLUCONAZOLE 150 MG/1
150 TABLET ORAL ONCE
Qty: 1 TABLET | Refills: 0 | Status: SHIPPED | OUTPATIENT
Start: 2023-09-19 | End: 2023-09-19

## 2023-09-19 NOTE — TELEPHONE ENCOUNTER
Caller: BernadetteSerenity    Relationship: Self    Best call back number: [phone]    What medication are you requesting: SOMETHING FOR YEAST INFECTION    What are your current symptoms: YEAST INFECTION    How long have you been experiencing symptoms: ABOUT 3 DAYS    Have you had these symptoms before:    [] Yes  [] No    Have you been treated for these symptoms before:   [] Yes  [] No    If a prescription is needed, what is your preferred pharmacy and phone number:  [id number] [place] - [address] - [phone] [facility] [phone]  [phone]     Additional notes:PATIENT HAS BEEN HAVING SYMPTOMS FOR THREE DAYS AND IS REQUESTING PRESCRIPTION

## 2023-09-19 NOTE — TELEPHONE ENCOUNTER
Patient having yeast infection symptoms for 3 days.  Asking for prescription.  Pended diflucan for you.

## 2023-09-21 ENCOUNTER — OFFICE VISIT (OUTPATIENT)
Dept: FAMILY MEDICINE CLINIC | Facility: CLINIC | Age: 53
End: 2023-09-21
Payer: COMMERCIAL

## 2023-09-21 VITALS
WEIGHT: 285 LBS | DIASTOLIC BLOOD PRESSURE: 68 MMHG | HEIGHT: 63 IN | RESPIRATION RATE: 18 BRPM | SYSTOLIC BLOOD PRESSURE: 126 MMHG | HEART RATE: 87 BPM | TEMPERATURE: 98.7 F | BODY MASS INDEX: 50.5 KG/M2 | OXYGEN SATURATION: 98 %

## 2023-09-21 DIAGNOSIS — Z23 NEED FOR VACCINATION WITH 20-POLYVALENT PNEUMOCOCCAL CONJUGATE VACCINE: ICD-10-CM

## 2023-09-21 DIAGNOSIS — E11.9 TYPE 2 DIABETES MELLITUS WITHOUT COMPLICATION, WITHOUT LONG-TERM CURRENT USE OF INSULIN: Primary | Chronic | ICD-10-CM

## 2023-09-21 DIAGNOSIS — Z23 NEED FOR INFLUENZA VACCINATION: ICD-10-CM

## 2023-09-21 DIAGNOSIS — E78.2 MIXED HYPERLIPIDEMIA: Chronic | ICD-10-CM

## 2023-09-21 DIAGNOSIS — I10 ESSENTIAL HYPERTENSION: Chronic | ICD-10-CM

## 2023-09-21 PROBLEM — Z00.00 ANNUAL PHYSICAL EXAM: Status: RESOLVED | Noted: 2023-06-20 | Resolved: 2023-09-21

## 2023-09-21 RX ORDER — SEMAGLUTIDE 1.34 MG/ML
1 INJECTION, SOLUTION SUBCUTANEOUS WEEKLY
Qty: 9 ML | Refills: 1 | Status: SHIPPED | OUTPATIENT
Start: 2023-09-21

## 2023-09-21 RX ORDER — AMOXICILLIN AND CLAVULANATE POTASSIUM 875; 125 MG/1; MG/1
TABLET, FILM COATED ORAL
COMMUNITY
Start: 2023-09-15

## 2023-09-21 RX ORDER — FLUCONAZOLE 150 MG/1
TABLET ORAL
COMMUNITY
Start: 2023-09-19

## 2023-09-21 NOTE — ASSESSMENT & PLAN NOTE
Lipid abnormalities are improving with lifestyle modifications.  Nutritional counseling was provided.  Lipids will be reassessed in 6 months.    The 10-year ASCVD risk score (Silvano WALKER, et al., 2019) is: 4.4%    Values used to calculate the score:      Age: 53 years      Sex: Female      Is Non- : No      Diabetic: Yes      Tobacco smoker: No      Systolic Blood Pressure: 126 mmHg      Is BP treated: Yes      HDL Cholesterol: 52 mg/dL      Total Cholesterol: 169 mg/dL

## 2023-09-21 NOTE — PROGRESS NOTES
"Chief Complaint  Hypertension, Diabetes, and Hyperlipidemia    Subjective        Serenity Fleming presents to [facility]  History of Present Illness  She is here today to establish relations as a new patient. She is a retired nurse. She is a previous patient of Dr. Arboleda.  She has anemia, hypertension, B12 deficiency, GERD, hyperlipidemia, morbid obesity, type 2 diabetes and vitamin D deficiency.  She is  and has 1 son.     She has been taking Ozempic and her blood sugar is improving. She has lost 11 lbs.      She checks her blood pressure at home and says it runs in the mid 120s systolically.     The patient has no other complaints today and denies chest pain, shortness of breath, weakness, numbness, nausea, vomiting, diarrhea, dizziness or syncopal event.      Objective   Vital Signs:  /68 (BP Location: Left arm, Patient Position: Sitting, Cuff Size: Adult)   Pulse 87   Temp 98.7 °F (37.1 °C) (Tympanic)   Resp 18   Ht 160 cm (62.99\")   Wt 129 kg (285 lb)   SpO2 98%   BMI 50.50 kg/m²   Estimated body mass index is 50.5 kg/m² as calculated from the following:    Height as of this encounter: 160 cm (62.99\").    Weight as of this encounter: 129 kg (285 lb).               Physical Exam  Vitals reviewed.   Constitutional:       Appearance: She is well-developed. She is morbidly obese.   HENT:      Head: Normocephalic and atraumatic.      Right Ear: External ear normal.      Left Ear: External ear normal.      Mouth/Throat:      Pharynx: No oropharyngeal exudate.   Eyes:      Conjunctiva/sclera: Conjunctivae normal.      Pupils: Pupils are equal, round, and reactive to light.   Neck:      Vascular: No carotid bruit.   Cardiovascular:      Rate and Rhythm: Normal rate and regular rhythm.      Heart sounds: No murmur heard.    No friction rub. No gallop.   Pulmonary:      Effort: Pulmonary effort is normal.      Breath sounds: Normal breath sounds. No wheezing or " rhonchi.   Abdominal:      General: There is no distension.   Skin:     General: Skin is warm and dry.   Neurological:      Mental Status: She is alert and oriented to person, place, and time.      Cranial Nerves: No cranial nerve deficit.      Motor: No weakness.   Psychiatric:         Mood and Affect: Mood and affect normal.         Behavior: Behavior normal.         Thought Content: Thought content normal.         Judgment: Judgment normal.      Result Review :    CMP          9/18/2023    13:03   CMP   Glucose 95    BUN 6    Creatinine 0.80    EGFR 87.1    Sodium 142    Potassium 3.8    Chloride 103    Calcium 9.6    Total Protein 7.8    Albumin 4.3    Globulin 3.5    Total Bilirubin 0.3    Alkaline Phosphatase 90    AST (SGOT) 33    ALT (SGPT) 36    Albumin/Globulin Ratio 1.2    BUN/Creatinine Ratio 7.5    Anion Gap 13.2      CBC          9/18/2023    13:03   CBC   WBC 7.59    RBC 5.05    Hemoglobin 14.5    Hematocrit 43.2    MCV 85.5    MCH 28.7    MCHC 33.6    RDW 13.3    Platelets 308      Lipid Panel          9/18/2023    13:03   Lipid Panel   Total Cholesterol 169    Triglycerides 81    HDL Cholesterol 52    VLDL Cholesterol 15    LDL Cholesterol  102    LDL/HDL Ratio 1.94      TSH          9/18/2023    13:03   TSH   TSH 1.300                   Assessment and Plan   Diagnoses and all orders for this visit:    1. Type 2 diabetes mellitus without complication, without long-term current use of insulin (Primary)  Assessment & Plan:  Diabetes is improving with treatment.   Medication changes per orders.  Diabetes will be reassessed in 6 months.    Orders:  -     Semaglutide, 1 MG/DOSE, (Ozempic, 1 MG/DOSE,) 4 MG/3ML solution pen-injector; Inject 1 mg under the skin into the appropriate area as directed 1 (One) Time Per Week.  Dispense: 9 mL; Refill: 1    2. Need for vaccination with 20-polyvalent pneumococcal conjugate vaccine  -     Pneumococcal Conjugate Vaccine 20-Valent (PCV20)    3. Need for influenza  vaccination  -     Fluzone (or Fluarix & Flulaval for VFC) >6mos    4. Essential hypertension  Assessment & Plan:  Hypertension is improving with treatment.  Continue current treatment regimen.  Dietary sodium restriction.  Weight loss.  Blood pressure will be reassessed at the next regular appointment.      5. Mixed hyperlipidemia  Assessment & Plan:  Lipid abnormalities are improving with lifestyle modifications.  Nutritional counseling was provided.  Lipids will be reassessed in 6 months.    The 10-year ASCVD risk score (Silvano WALKER, et al., 2019) is: 4.4%    Values used to calculate the score:      Age: 53 years      Sex: Female      Is Non- : No      Diabetic: Yes      Tobacco smoker: No      Systolic Blood Pressure: 126 mmHg      Is BP treated: Yes      HDL Cholesterol: 52 mg/dL      Total Cholesterol: 169 mg/dL                 Follow Up   Return in about 3 months (around 12/21/2023).  Patient was given instructions and counseling regarding her condition or for health maintenance advice. Please see specific information pulled into the AVS if appropriate.       Answers submitted by the patient for this visit:  Other (Submitted on 9/18/2023)  Please describe your symptoms.: Follow up appointment  Have you had these symptoms before?: No  How long have you been having these symptoms?: 1-4 days  Primary Reason for Visit (Submitted on 9/18/2023)  What is the primary reason for your visit?: Other

## 2023-09-27 ENCOUNTER — HOSPITAL ENCOUNTER (EMERGENCY)
Facility: HOSPITAL | Age: 53
Discharge: HOME | End: 2023-09-28
Attending: EMERGENCY MEDICINE
Payer: COMMERCIAL

## 2023-09-27 ENCOUNTER — APPOINTMENT (OUTPATIENT)
Dept: GENERAL RADIOLOGY | Facility: HOSPITAL | Age: 53
End: 2023-09-27
Payer: COMMERCIAL

## 2023-09-27 ENCOUNTER — TELEPHONE (OUTPATIENT)
Dept: FAMILY MEDICINE CLINIC | Facility: CLINIC | Age: 53
End: 2023-09-27
Payer: COMMERCIAL

## 2023-09-27 DIAGNOSIS — M25.562 POSTERIOR KNEE PAIN, LEFT: Primary | ICD-10-CM

## 2023-09-27 PROCEDURE — 99283 EMERGENCY DEPT VISIT LOW MDM: CPT

## 2023-09-27 PROCEDURE — 73562 X-RAY EXAM OF KNEE 3: CPT

## 2023-09-27 NOTE — Clinical Note
[facility] EMERGENCY ROOM  [address]  [place] [address]  Phone: [phone]    Serenity Fleming was seen and treated in our emergency department on 9/27/2023.  She may return to work on 09/30/2023.         Thank you for choosing [facility].    Venice Mancilla APRN

## 2023-09-27 NOTE — TELEPHONE ENCOUNTER
[facility]  [facility] use [facility] due to her [facility] so she cannot use another pharmacy.  She is asking for guidance on what to do moving forward.  She is currently out of medication today, she does her shots on Thursdays.  Please advise.

## 2023-09-28 VITALS
OXYGEN SATURATION: 99 % | SYSTOLIC BLOOD PRESSURE: 138 MMHG | BODY MASS INDEX: 52.94 KG/M2 | WEIGHT: 280.43 LBS | TEMPERATURE: 97.9 F | RESPIRATION RATE: 16 BRPM | HEIGHT: 61 IN | HEART RATE: 74 BPM | DIASTOLIC BLOOD PRESSURE: 84 MMHG

## 2023-09-28 RX ORDER — HYDROCODONE BITARTRATE AND ACETAMINOPHEN 5; 325 MG/1; MG/1
1 TABLET ORAL ONCE
Status: COMPLETED | OUTPATIENT
Start: 2023-09-28 | End: 2023-09-28

## 2023-09-28 RX ORDER — TIRZEPATIDE 2.5 MG/.5ML
2.5 INJECTION, SOLUTION SUBCUTANEOUS
Qty: 2 ML | Refills: 0 | Status: SHIPPED | OUTPATIENT
Start: 2023-09-28

## 2023-09-28 RX ADMIN — HYDROCODONE BITARTRATE AND ACETAMINOPHEN 1 TABLET: 5; 325 TABLET ORAL at 01:25

## 2023-09-28 NOTE — DISCHARGE INSTRUCTIONS
Rest. Ice. Wear brace or ace wrap. Elevate.    Use crutches for ambulation    Follow up with orthopedist. Referral has been placed. They will call you for appt time

## 2023-09-28 NOTE — ED TRIAGE NOTES
Pt to ED from home with reports of left knee pain.      Pt states she was walking up steps at 2100 and felt pop behind left knee and now is unable to bear weight/walk without pain.      Pt was recently on cipro and stopped r/t dizziness.  Pt states she is concerned cipro caused tendon rupture behind knee.

## 2023-09-28 NOTE — ED PROVIDER NOTES
Time: 12:22 AM EDT  Date of encounter:  2023  Independent Historian/Clinical History and Information was obtained by:   Patient    History is limited by: N/A    Chief Complaint: Left knee pain      History of Present Illness:  Patient is a 53 y.o. year old female who presents to the emergency department for evaluation of left knee pain.  Patient states she was walking up the stairs and felt a popping sensation and pulling pain in the back of her left knee and thigh.  Patient did not fall down.  This occurred about 9:00 and patient has been unable to walk without severe pain since then.  Patient recently was on Cipro and she is concerned that maybe she had a tendon rupture since that was one of the side effects.  Patient denies any numbness tingling or weakness.  No swelling.  No back pain.    HPI    Patient Care Team  Primary Care Provider: Capri Rodriguez DO    Past Medical History:     Allergies   Allergen Reactions    Codeine Shortness Of Breath    Ciprofloxacin Dizziness     Past Medical History:   Diagnosis Date    Acid reflux     Allergic     Allergic to Codeine    Anemia     B12 deficiency 2022    Claustrophobia     Colon polyps     Condition not found 2016    right shoulder scope aftercare following surgery of the musuloskeletal system    Essential hypertension     Gall stones     GERD (gastroesophageal reflux disease)     Glaucoma     Glaucoma     Heavy menses     Hyperlipidemia 2022    Hypertension     PCOS (polycystic ovarian syndrome) 2022    Type 2 diabetes mellitus without complication, without long-term current use of insulin 2022    Vitamin D deficiency 2022     Past Surgical History:   Procedure Laterality Date    ADENOIDECTOMY       SECTION      CHOLECYSTECTOMY      DILATATION AND CURETTAGE      ENDOMETRIAL ABLATION      ENDOSCOPY  2019    GALLBLADDER SURGERY  1996    KNEE CARTILAGE SURGERY      MENISCECTOMY Right     meniscus tear    OTHER SURGICAL  HISTORY      surgical clips     OTHER SURGICAL HISTORY  2011    uterine ablation     ROTATOR CUFF REPAIR      TONSILLECTOMY       Family History   Problem Relation Age of Onset    Breast cancer Other 50    Hypertension Mother     Diabetes Mother     Kidney disease Mother     Cancer Father     Lymphoma Father         malignant     Hypertension Sister     Hypertension Brother     No Known Problems Daughter     No Known Problems Son     No Known Problems Maternal Grandmother     No Known Problems Paternal Grandmother     Hypertension Maternal Aunt     Stomach cancer Paternal Aunt         malignant    Hypertension Maternal Grandfather     BRCA 1/2 Neg Hx     Colon cancer Neg Hx     Endometrial cancer Neg Hx     Ovarian cancer Neg Hx        Home Medications:  Prior to Admission medications    Medication Sig Start Date End Date Taking? Authorizing Provider   albuterol sulfate  (90 Base) MCG/ACT inhaler Inhale 2 puffs Every 4 (Four) Hours As Needed for Wheezing. 6/13/23   Migue Barkley PA-C   amoxicillin-clavulanate (AUGMENTIN) 875-125 MG per tablet TAKE 1 TABLET BY MOUTH TWICE A DAY X10D 9/15/23   Yari Wyatt MD   Cholecalciferol (vitamin D3) 125 MCG (5000 UT) capsule capsule Take 4,000 Units by mouth Daily.    Yari Wyatt MD   fluconazole (DIFLUCAN) 150 MG tablet  9/19/23   Yari Wyatt MD   hydroCHLOROthiazide (MICROZIDE) 12.5 MG capsule TAKE 1 CAPSULE BY MOUTH EVERY DAY 8/24/23   Capri Rodriguez DO   losartan (Cozaar) 25 MG tablet Take 1 tablet by mouth Daily. 6/20/23   Capri Rodriguez,    Multiple Vitamins-Minerals (MULTIVITAMIN ADULT PO) Take  by mouth.    Yari Wyatt MD   nystatin (MYCOSTATIN) 029522 UNIT/GM cream Apply 1 application topically to the appropriate area as directed As Needed (skin yeast). 11/14/22   Yumiko Vail MD   nystatin (MYCOSTATIN) 097577 UNIT/GM powder Apply  topically to the appropriate area as directed 2 (Two) Times a Day. 11/14/22    "Yumiko Vail MD   omeprazole (priLOSEC) 20 MG capsule Take 1 capsule by mouth Daily. 3/3/23   Jessica Torres APRN   Semaglutide, 1 MG/DOSE, (Ozempic, 1 MG/DOSE,) 4 MG/3ML solution pen-injector Inject 1 mg under the skin into the appropriate area as directed 1 (One) Time Per Week. 9/21/23   Capri Rodriguez DO        Social History:   Social History     Tobacco Use    Smoking status: Never     Passive exposure: Never    Smokeless tobacco: Never    Tobacco comments:     Past childhood smoke exposure , no current.    Vaping Use    Vaping Use: Never used   Substance Use Topics    Alcohol use: No    Drug use: No         Review of Systems:  Review of Systems   Constitutional:  Negative for fever.   Respiratory:  Negative for shortness of breath.    Cardiovascular:  Negative for chest pain and leg swelling.   Musculoskeletal:  Positive for arthralgias (Left knee) and gait problem. Negative for joint swelling.   Skin:  Negative for color change and rash.   Neurological:  Negative for weakness and numbness.   Hematological: Negative.    Psychiatric/Behavioral: Negative.     All other systems reviewed and are negative.     Physical Exam:  /84   Pulse 74   Temp 97.9 °F (36.6 °C) (Oral)   Resp 16   Ht 154.9 cm (61\")   Wt 127 kg (280 lb 6.8 oz)   SpO2 99%   BMI 52.99 kg/m²     Physical Exam  Vitals and nursing note reviewed.   Constitutional:       General: She is not in acute distress.     Appearance: Normal appearance. She is not toxic-appearing.   HENT:      Head: Normocephalic and atraumatic.      Nose: Nose normal.      Mouth/Throat:      Mouth: Mucous membranes are moist.   Eyes:      General: No scleral icterus.     Conjunctiva/sclera: Conjunctivae normal.   Cardiovascular:      Pulses: Normal pulses.   Pulmonary:      Effort: Pulmonary effort is normal. No respiratory distress.   Abdominal:      Tenderness: There is no abdominal tenderness.   Musculoskeletal:         General: Tenderness (Posterior left " knee and lower hamstring) present. No swelling or deformity.      Cervical back: Normal range of motion.      Comments: Minimal range of motion of the left knee due to severe pain   Skin:     General: Skin is warm and dry.      Capillary Refill: Capillary refill takes less than 2 seconds.      Findings: No bruising or erythema.   Neurological:      Mental Status: She is alert and oriented to person, place, and time.      Sensory: No sensory deficit.      Motor: No weakness.      Gait: Gait abnormal (Patient unable to bear weight due to pain).   Psychiatric:         Mood and Affect: Mood normal.         Behavior: Behavior normal.              Medical Decision Making:      Comorbidities that affect care:    Diabetes, Hypertension, Obesity    External Notes reviewed:    Previous Clinic Note: Patient's last visit was with PCP on September 20 for management of chronic comorbid conditions of diabetes and also annual vaccinations      The following orders were placed and all results were independently analyzed by me:  Orders Placed This Encounter   Procedures    XR Knee 3 View Left    Ambulatory Referral to Orthopedic Surgery    Obtain & Apply The Following- Lower extremity; Hinged knee brace       Medications Given in the Emergency Department:  Medications   HYDROcodone-acetaminophen (NORCO) 5-325 MG per tablet 1 tablet (1 tablet Oral Given 9/28/23 0125)        ED Course:    ED Course as of 09/28/23 0544   Thu Sep 28, 2023   0107 XR Knee 3 View Left  No acute fx [DS]      ED Course User Index  [DS] Venice Mancilla APRN       Labs:    Lab Results (last 24 hours)       ** No results found for the last 24 hours. **             Imaging:    XR Knee 3 View Left    Result Date: 9/28/2023  PROCEDURE: XR KNEE 3 VW LEFT  COMPARISON: None.  INDICATIONS: left knee unable to bear weight with posterior left knee pain post left knee popping while going up stairs x 2 hours ago  FINDINGS: 3 nonweightbearing (portable) views of the left knee  were obtained.  No acute fracture or acute malalignment is identified.  No left knee joint effusion is suggested.  Mild degenerative changes are present.  External artifacts obscure detail.  If symptoms or clinical concerns persist, consider imaging follow-up.       No acute fracture or acute malalignment is identified.     Please note that portions of this note were completed with a voice recognition program.  AGUS VARGHESE JR, MD       Electronically Signed and Approved By: AGUS VARGHESE JR, MD on 9/28/2023 at 0:52                 Differential Diagnosis and Discussion:    Extremity Pain: Differential diagnosis includes but is not limited to soft tissue sprain, tendonitis, tendon injury, dislocation, fracture, deep vein thrombosis, arterial insufficiency, osteoarthritis, bursitis, and ligamentous damage.    All X-rays impressions were independently interpreted by me.    MDM  Number of Diagnoses or Management Options  Posterior knee pain, left  Diagnosis management comments: The patient's symptoms are consistent with a strain vs. sprain.      A muscle strain, also known as a pulled muscle, is an injury that occurs when a muscle is overstretched or torn, often as a result of fatigue, overuse, or improper use. This can result in pain, swelling, and a limited range of motion.     A sprain, on the other hand, is an injury to a ligament, which is the tissue that connects bones to each other. Sprains often occur in joints like the ankle or wrist when they are twisted or impacted in a way that stretches or tears the ligaments. Symptoms of a sprain can include pain, swelling, bruising, and a decreased ability to move the joint.     The patient was counseled to use rest, ice, and elevation and follow-up with their PCP or an orthopedic surgeon.       Amount and/or Complexity of Data Reviewed  Tests in the radiology section of CPT®: reviewed and ordered  Tests in the medicine section of CPT®: ordered and reviewed    Risk of  Complications, Morbidity, and/or Mortality  Presenting problems: low  Diagnostic procedures: low  Management options: low    Patient Progress  Patient progress: stable         Patient Care Considerations:    NARCOTICS: I considered prescribing opiate pain medication as an outpatient, however no acute bony abnormality on imaging      Consultants/Shared Management Plan:    None    Social Determinants of Health:    Patient has presented with family members who are responsible, reliable and will ensure follow up care.      Disposition and Care Coordination:    Discharged: The patient is suitable and stable for discharge with no need for consideration of observation or admission.    I have explained the patient´s condition, diagnoses and treatment plan based on the information available to me at this time. I have answered questions and addressed any concerns. The patient has a good  understanding of the patient´s diagnosis, condition, and treatment plan as can be expected at this point. The vital signs have been stable. The patient´s condition is stable and appropriate for discharge from the emergency department.      The patient will pursue further outpatient evaluation with the primary care physician or other designated or consulting physician as outlined in the discharge instructions. They are agreeable to this plan of care and follow-up instructions have been explained in detail. The patient has received these instructions in written format and have expressed an understanding of the discharge instructions. The patient is aware that any significant change in condition or worsening of symptoms should prompt an immediate return to this or the closest emergency department or call to 911.  I have explained discharge medications and the need for follow up with the patient/caretakers. This was also printed in the discharge instructions. Patient was discharged with the following medications and follow up:      Medication  List        New Prescriptions      diclofenac 50 MG EC tablet  Commonly known as: VOLTAREN  Take 1 tablet by mouth 3 (Three) Times a Day As Needed (pain).               Where to Get Your Medications        These medications were sent to [facility]/pharmacy #[id number] - [place], [place] - [address] - [phone]  - [phone] FX  [address] [place] [address]      Hours: 24-hours Phone: [phone]   diclofenac 50 MG EC tablet      [facility]  [address] [facility] [id number] [phone]           Final diagnoses:   Posterior knee pain, left        ED Disposition       ED Disposition   Discharge    Condition   Stable    Comment   --               This medical record created using voice recognition software.             Venice Mancilla, JAMESON  09/28/23 0585

## 2023-10-02 ENCOUNTER — OFFICE VISIT (OUTPATIENT)
Dept: ORTHOPEDIC SURGERY | Facility: CLINIC | Age: 53
End: 2023-10-02
Payer: COMMERCIAL

## 2023-10-02 VITALS — WEIGHT: 280 LBS | HEIGHT: 61 IN | HEART RATE: 87 BPM | BODY MASS INDEX: 52.87 KG/M2 | OXYGEN SATURATION: 98 %

## 2023-10-02 DIAGNOSIS — M23.92 INTERNAL DERANGEMENT OF LEFT KNEE: Primary | ICD-10-CM

## 2023-10-02 NOTE — PROGRESS NOTES
"Chief Complaint  Pain and Initial Evaluation of the Left Knee    Subjective          Serenity Fleming presents to [facility] for   History of Present Illness    The patient presents here today for evaluation of the left knee. She reports left knee pain after having a pop to the posterior knee. She reports pain with walking. She is ambulating with a crutch. She denies pain prior to the pop.     Allergies   Allergen Reactions    Codeine Shortness Of Breath    Ciprofloxacin Dizziness        Social History     Socioeconomic History    Marital status:    Tobacco Use    Smoking status: Never     Passive exposure: Never    Smokeless tobacco: Never    Tobacco comments:     Past childhood smoke exposure , no current.    Vaping Use    Vaping Use: Never used   Substance and Sexual Activity    Alcohol use: No    Drug use: No    Sexual activity: Yes     Partners: Male     Birth control/protection: Post-menopausal, None        I reviewed the patient's chief complaint, history of present illness, review of systems, past medical history, surgical history, family history, social history, medications, and allergy list.     REVIEW OF SYSTEMS    Constitutional: Denies fevers, chills, weight loss  Cardiovascular: Denies chest pain, shortness of breath  Skin: Denies rashes, acute skin changes  Neurologic: Denies headache, loss of consciousness  MSK: Left knee pain      Objective   Vital Signs:   Pulse 87   Ht 154.9 cm (61\")   Wt 127 kg (280 lb)   SpO2 98%   BMI 52.91 kg/m²     Body mass index is 52.91 kg/m².    Physical Exam    General: Alert. No acute distress.   Left knee- Positive EHL, FHL, GS and TA. Sensation intact to all 5 nerves of the foot. Positive pulses. Full extension with pain. Posterior pain to the popliteal fossa. Flexion 100. Positive crepitus. Stable to varus/valgus stress. Knee Extensor Mechanism  intact. Medial joint line and lateral joint line tenderness. Pain with Jamaal's. " Intact active knee flexion and extension.    Procedures    Imaging Results (Most Recent)       None                     Assessment and Plan        XR Knee 3 View Left    Result Date: 9/28/2023  Narrative: PROCEDURE: XR KNEE 3 VW LEFT  COMPARISON: None.  INDICATIONS: left knee unable to bear weight with posterior left knee pain post left knee popping while going up stairs x 2 hours ago  FINDINGS: 3 nonweightbearing (portable) views of the left knee were obtained.  No acute fracture or acute malalignment is identified.  No left knee joint effusion is suggested.  Mild degenerative changes are present.  External artifacts obscure detail.  If symptoms or clinical concerns persist, consider imaging follow-up.      Impression:  No acute fracture or acute malalignment is identified.     Please note that portions of this note were completed with a voice recognition program.  AGUS VARGHESE JR, MD       Electronically Signed and Approved By: AGUS VARGHESE JR, MD on 9/28/2023 at 0:52                Diagnoses and all orders for this visit:    1. Internal derangement of left knee (Primary)        Discussed the treatment plan with the patient.  I reviewed the previous x-rays. Plan for MRI of the left knee to evaluate for internal derangement. Home exercises given today.       Educated on risk of elevated BMI.  Discussed options for weight loss/decreasing BMI prior to procedure including dietician consult, weight loss options and exercise program. and Call or return if worsening symptoms.    Scribed for Simon Hwang MD by Doreen Ag  10/02/2023   08:55 EDT         Follow Up       MRI results    Patient was given instructions and counseling regarding her condition or for health maintenance advice. Please see specific information pulled into the AVS if appropriate.       I have personally performed the services described in this document as scribed by the above individual and it is both accurate and complete.     Simon  MD Jaiden  10/02/23  09:13 EDT

## 2023-10-03 DIAGNOSIS — E11.9 TYPE 2 DIABETES MELLITUS WITHOUT COMPLICATION, WITHOUT LONG-TERM CURRENT USE OF INSULIN: Primary | ICD-10-CM

## 2023-10-03 RX ORDER — TIRZEPATIDE 2.5 MG/.5ML
2.5 INJECTION, SOLUTION SUBCUTANEOUS WEEKLY
Qty: 2 ML | Refills: 0 | Status: SHIPPED | OUTPATIENT
Start: 2023-10-03 | End: 2023-10-09

## 2023-10-09 ENCOUNTER — TELEPHONE (OUTPATIENT)
Dept: ORTHOPEDIC SURGERY | Facility: CLINIC | Age: 53
End: 2023-10-09

## 2023-10-09 NOTE — TELEPHONE ENCOUNTER
Caller: ZARA NICOLE     Relationship to patient: PATIENT     Best call back number: [phone]    Chief complaint: LEFT KNEE     Type of visit: MRI AND MRI FOLLOW UP     Requested date: SOONER THAN WHAT SHE IS SCHEDULED      If rescheduling, when is the original appointment: MRI 10/24/23 [facility]  FOLLOW UP 10/30/23     Additional notes:PATIENT STATES SHE IS WALKING ON CRUTCHES, NON WEIGHT BEARING WITHOUT SEVERE PAIN    IS THERE ANYTHING YOU CAN DO TO GET HER MRI  AND MRI FOLLOW UP UP SCHEDULED SOONER ?

## 2023-10-09 NOTE — TELEPHONE ENCOUNTER
PATIENT STATED SHE IS USING CRUTCHES TO AMBULATE DUE TO PAIN. PATIENT STATED SHE HAS MRI SCHEDULED FOR THURSDAY 10/12/23 AND FOLLOW UP WITH DR CARBAJAL ON 10/16/23.

## 2023-10-12 ENCOUNTER — HOSPITAL ENCOUNTER (OUTPATIENT)
Dept: MRI IMAGING | Facility: HOSPITAL | Age: 53
Discharge: HOME | End: 2023-10-12
Admitting: STUDENT IN AN ORGANIZED HEALTH CARE EDUCATION/TRAINING PROGRAM
Payer: COMMERCIAL

## 2023-10-12 DIAGNOSIS — M23.92 INTERNAL DERANGEMENT OF LEFT KNEE: ICD-10-CM

## 2023-10-12 PROCEDURE — 73721 MRI JNT OF LWR EXTRE W/O DYE: CPT

## 2023-10-16 ENCOUNTER — OFFICE VISIT (OUTPATIENT)
Dept: ORTHOPEDIC SURGERY | Facility: CLINIC | Age: 53
End: 2023-10-16
Payer: COMMERCIAL

## 2023-10-16 ENCOUNTER — PREP FOR SURGERY (OUTPATIENT)
Dept: OTHER | Facility: HOSPITAL | Age: 53
End: 2023-10-16
Payer: COMMERCIAL

## 2023-10-16 VITALS
OXYGEN SATURATION: 96 % | WEIGHT: 280 LBS | SYSTOLIC BLOOD PRESSURE: 120 MMHG | DIASTOLIC BLOOD PRESSURE: 79 MMHG | HEART RATE: 82 BPM | BODY MASS INDEX: 52.87 KG/M2 | HEIGHT: 61 IN

## 2023-10-16 DIAGNOSIS — S83.242A ACUTE MEDIAL MENISCUS TEAR OF LEFT KNEE, INITIAL ENCOUNTER: Primary | ICD-10-CM

## 2023-10-16 DIAGNOSIS — I10 ESSENTIAL HYPERTENSION: Chronic | ICD-10-CM

## 2023-10-16 DIAGNOSIS — M17.12 PRIMARY OSTEOARTHRITIS OF LEFT KNEE: ICD-10-CM

## 2023-10-16 PROCEDURE — 99214 OFFICE O/P EST MOD 30 MIN: CPT | Performed by: STUDENT IN AN ORGANIZED HEALTH CARE EDUCATION/TRAINING PROGRAM

## 2023-10-16 RX ORDER — CEFAZOLIN SODIUM IN 0.9 % NACL 3 G/100 ML
3 INTRAVENOUS SOLUTION, PIGGYBACK (ML) INTRAVENOUS ONCE
OUTPATIENT
Start: 2023-10-16 | End: 2023-10-16

## 2023-10-16 RX ORDER — LOSARTAN POTASSIUM 25 MG/1
25 TABLET ORAL DAILY
Qty: 90 TABLET | Refills: 0 | Status: SHIPPED | OUTPATIENT
Start: 2023-10-16

## 2023-10-16 RX ORDER — CEFAZOLIN SODIUM 2 G/100ML
2 INJECTION, SOLUTION INTRAVENOUS ONCE
OUTPATIENT
Start: 2023-10-16 | End: 2023-10-16

## 2023-10-16 NOTE — PROGRESS NOTES
"Chief Complaint  Follow-up and Pain of the Left Knee    Subjective          Serenity Fleming presents to [facility] for   History of Present Illness    The patient presents here today for follow up evaluation of the left knee. The patient recently had an MRI and is here today for those results. To review, She reports left knee pain after having a pop to the posterior knee. She reports pain with walking. She is ambulating with a crutch. She denies pain prior to the pop.    Allergies   Allergen Reactions    Codeine Shortness Of Breath    Ciprofloxacin Dizziness        Social History     Socioeconomic History    Marital status:    Tobacco Use    Smoking status: Never     Passive exposure: Never    Smokeless tobacco: Never    Tobacco comments:     Past childhood smoke exposure , no current.    Vaping Use    Vaping Use: Never used   Substance and Sexual Activity    Alcohol use: No    Drug use: No    Sexual activity: Yes     Partners: Male     Birth control/protection: Post-menopausal, None        I reviewed the patient's chief complaint, history of present illness, review of systems, past medical history, surgical history, family history, social history, medications, and allergy list.     REVIEW OF SYSTEMS    Constitutional: Denies fevers, chills, weight loss  Cardiovascular: Denies chest pain, shortness of breath  Skin: Denies rashes, acute skin changes  Neurologic: Denies headache, loss of consciousness  MSK: Left knee pain      Objective   Vital Signs:   /79   Pulse 82   Ht 154.9 cm (61\")   Wt 127 kg (280 lb)   SpO2 96%   BMI 52.91 kg/m²     Body mass index is 52.91 kg/m².    Physical Exam    General: Alert. No acute distress.   Left knee- Positive EHL, FHL, GS and TA. Sensation intact to all 5 nerves of the foot. Positive pulses. Full extension with pain. Posterior pain to the popliteal fossa. Flexion 100. Positive crepitus. Stable to varus/valgus stress. Knee Extensor " Mechanism  intact. Medial joint line and lateral joint line tenderness. Pain with Jamaal's. Intact active knee flexion and extension.     Procedures    Imaging Results (Most Recent)       None                     Assessment and Plan        MRI Knee Left Without Contrast    Result Date: 10/13/2023  Narrative: PROCEDURE: MRI KNEE LEFT  WO CONTRAST  COMPARISON: None  INDICATIONS: POSTERIOR AND LATERAL LEFT KNEE PAIN AFTER FEELING POP WHEN GOING UP STAIRS.      TECHNIQUE: A complete multi-planar MRI was performed.   FINDINGS:  No fracture is demonstrated.  The patella is subluxed laterally 0.9 cm.  Marrow signal appears normal.  There is a full-thickness tear of the medial meniscal posterior root.  Intermediate signal abnormality in the medial meniscus is consistent with degenerative change.  No tear of the lateral meniscus is identified.  The medial collateral ligament, cruciate ligaments, lateral collateral ligament complex, patellar retinacula and extensor mechanism appear intact.  A small joint effusion is noted.  There is grade 3-4 chondromalacia along the patellar lateral facet measuring up to 1.3 cm transverse.  There is grade 2 chondromalacia along the medial patellar facet measuring 1.5 cm transverse.  Grade 1 chondromalacia is noted in the lateral compartment.  Grade 2-3 chondromalacia is noted along the weight-bearing aspect of the medial femoral condyle.  Mild osteoarthritic spurring is noted.  1.9 cm popliteal cyst is noted.      Impression:   1. Full-thickness tear of the medial meniscal posterior root 2. 0.9 cm lateral patellar subluxation 3. Small joint effusion 4. Osteoarthritic changes, as above     Wesley Woo M.D.       Electronically Signed and Approved By: Wesley Woo M.D. on 10/13/2023 at 12:03             XR Knee 3 View Left    Result Date: 9/28/2023  Narrative: PROCEDURE: XR KNEE 3 VW LEFT  COMPARISON: None.  INDICATIONS: left knee unable to bear weight with posterior left knee pain post  left knee popping while going up stairs x 2 hours ago  FINDINGS: 3 nonweightbearing (portable) views of the left knee were obtained.  No acute fracture or acute malalignment is identified.  No left knee joint effusion is suggested.  Mild degenerative changes are present.  External artifacts obscure detail.  If symptoms or clinical concerns persist, consider imaging follow-up.      Impression:  No acute fracture or acute malalignment is identified.     Please note that portions of this note were completed with a voice recognition program.  AGUS VARGHESE JR, MD       Electronically Signed and Approved By: AGUS VARGHESE JR, MD on 9/28/2023 at 0:52                Diagnoses and all orders for this visit:    1. Acute medial meniscus tear of left knee, initial encounter (Primary)    2. Primary osteoarthritis of left knee        Discussed the treatment options with the patient, operative vs non-operative.  I reviewed the MRI with the patient. Discussed the risks and benefits of a left knee arthroscopic partial medial menisectomy and chondroplasty. I discussed with her that this will not fix her arthritis. The patient expressed understanding and wished to proceed.       Educated on risk of elevated BMI.  Discussed options for weight loss/decreasing BMI prior to procedure including dietician consult, weight loss options and exercise program., Discussed surgery., Risks/benefits discussed with patient including, but not limited to: infection, bleeding, neurovascular damage, re-rupture, aesthetic deformity, need for further surgery, and death., Discussed with patient the implant type being used during surgery and patient understands., Surgery pamphlet given., Call or return if worsening symptoms., and DME order for a 3 in 1 given today due to patient will be confined to one room/level of the home that does not offer a toilet during postop recovery.     Scribed for Simon Hwang MD by Doreen Ag  10/16/2023   14:42  EDT         Follow Up       2 weeks postoperatively.       Patient was given instructions and counseling regarding her condition or for health maintenance advice. Please see specific information pulled into the AVS if appropriate.       I have personally performed the services described in this document as scribed by the above individual and it is both accurate and complete.     Simon Hwang MD  10/16/23  14:53 EDT

## 2023-10-16 NOTE — H&P (VIEW-ONLY)
"Chief Complaint  Follow-up and Pain of the Left Knee    Subjective          Serenity Fleming presents to [facility] for   History of Present Illness    The patient presents here today for follow up evaluation of the left knee. The patient recently had an MRI and is here today for those results. To review, She reports left knee pain after having a pop to the posterior knee. She reports pain with walking. She is ambulating with a crutch. She denies pain prior to the pop.    Allergies   Allergen Reactions    Codeine Shortness Of Breath    Ciprofloxacin Dizziness        Social History     Socioeconomic History    Marital status:    Tobacco Use    Smoking status: Never     Passive exposure: Never    Smokeless tobacco: Never    Tobacco comments:     Past childhood smoke exposure , no current.    Vaping Use    Vaping Use: Never used   Substance and Sexual Activity    Alcohol use: No    Drug use: No    Sexual activity: Yes     Partners: Male     Birth control/protection: Post-menopausal, None        I reviewed the patient's chief complaint, history of present illness, review of systems, past medical history, surgical history, family history, social history, medications, and allergy list.     REVIEW OF SYSTEMS    Constitutional: Denies fevers, chills, weight loss  Cardiovascular: Denies chest pain, shortness of breath  Skin: Denies rashes, acute skin changes  Neurologic: Denies headache, loss of consciousness  MSK: Left knee pain      Objective   Vital Signs:   /79   Pulse 82   Ht 154.9 cm (61\")   Wt 127 kg (280 lb)   SpO2 96%   BMI 52.91 kg/m²     Body mass index is 52.91 kg/m².    Physical Exam    General: Alert. No acute distress.   Left knee- Positive EHL, FHL, GS and TA. Sensation intact to all 5 nerves of the foot. Positive pulses. Full extension with pain. Posterior pain to the popliteal fossa. Flexion 100. Positive crepitus. Stable to varus/valgus stress. Knee Extensor " Mechanism  intact. Medial joint line and lateral joint line tenderness. Pain with Jamaal's. Intact active knee flexion and extension.     Procedures    Imaging Results (Most Recent)       None                     Assessment and Plan        MRI Knee Left Without Contrast    Result Date: 10/13/2023  Narrative: PROCEDURE: MRI KNEE LEFT  WO CONTRAST  COMPARISON: None  INDICATIONS: POSTERIOR AND LATERAL LEFT KNEE PAIN AFTER FEELING POP WHEN GOING UP STAIRS.      TECHNIQUE: A complete multi-planar MRI was performed.   FINDINGS:  No fracture is demonstrated.  The patella is subluxed laterally 0.9 cm.  Marrow signal appears normal.  There is a full-thickness tear of the medial meniscal posterior root.  Intermediate signal abnormality in the medial meniscus is consistent with degenerative change.  No tear of the lateral meniscus is identified.  The medial collateral ligament, cruciate ligaments, lateral collateral ligament complex, patellar retinacula and extensor mechanism appear intact.  A small joint effusion is noted.  There is grade 3-4 chondromalacia along the patellar lateral facet measuring up to 1.3 cm transverse.  There is grade 2 chondromalacia along the medial patellar facet measuring 1.5 cm transverse.  Grade 1 chondromalacia is noted in the lateral compartment.  Grade 2-3 chondromalacia is noted along the weight-bearing aspect of the medial femoral condyle.  Mild osteoarthritic spurring is noted.  1.9 cm popliteal cyst is noted.      Impression:   1. Full-thickness tear of the medial meniscal posterior root 2. 0.9 cm lateral patellar subluxation 3. Small joint effusion 4. Osteoarthritic changes, as above     Wesley Woo M.D.       Electronically Signed and Approved By: Wesley Woo M.D. on 10/13/2023 at 12:03             XR Knee 3 View Left    Result Date: 9/28/2023  Narrative: PROCEDURE: XR KNEE 3 VW LEFT  COMPARISON: None.  INDICATIONS: left knee unable to bear weight with posterior left knee pain post  left knee popping while going up stairs x 2 hours ago  FINDINGS: 3 nonweightbearing (portable) views of the left knee were obtained.  No acute fracture or acute malalignment is identified.  No left knee joint effusion is suggested.  Mild degenerative changes are present.  External artifacts obscure detail.  If symptoms or clinical concerns persist, consider imaging follow-up.      Impression:  No acute fracture or acute malalignment is identified.     Please note that portions of this note were completed with a voice recognition program.  AGUS VARGHESE JR, MD       Electronically Signed and Approved By: AGUS VARGHESE JR, MD on 9/28/2023 at 0:52                Diagnoses and all orders for this visit:    1. Acute medial meniscus tear of left knee, initial encounter (Primary)    2. Primary osteoarthritis of left knee        Discussed the treatment options with the patient, operative vs non-operative.  I reviewed the MRI with the patient. Discussed the risks and benefits of a left knee arthroscopic partial medial menisectomy and chondroplasty. I discussed with her that this will not fix her arthritis. The patient expressed understanding and wished to proceed.       Educated on risk of elevated BMI.  Discussed options for weight loss/decreasing BMI prior to procedure including dietician consult, weight loss options and exercise program., Discussed surgery., Risks/benefits discussed with patient including, but not limited to: infection, bleeding, neurovascular damage, re-rupture, aesthetic deformity, need for further surgery, and death., Discussed with patient the implant type being used during surgery and patient understands., Surgery pamphlet given., Call or return if worsening symptoms., and DME order for a 3 in 1 given today due to patient will be confined to one room/level of the home that does not offer a toilet during postop recovery.     Scribed for Simon Hwang MD by Doreen Ag  10/16/2023   14:42  EDT         Follow Up       2 weeks postoperatively.       Patient was given instructions and counseling regarding her condition or for health maintenance advice. Please see specific information pulled into the AVS if appropriate.       I have personally performed the services described in this document as scribed by the above individual and it is both accurate and complete.     Simon Hwang MD  10/16/23  14:53 EDT

## 2023-10-20 NOTE — PRE-PROCEDURE INSTRUCTIONS
PRE-OP INSTRUCTIONS REVIEWED WITH PATIENT: FASTING/BATHING/ARRIVAL PROCEDURES.  INSTRUCTED TO TAKE A.M. DAY OF SURGERY: OMEPRAZOLE, ALBUTEROL INHALER PRN  UNDERSTANDING VERBALIZED.

## 2023-10-24 ENCOUNTER — HOSPITAL ENCOUNTER (OUTPATIENT)
Facility: HOSPITAL | Age: 53
Setting detail: HOSPITAL OUTPATIENT SURGERY
Discharge: HOME | End: 2023-10-24
Attending: STUDENT IN AN ORGANIZED HEALTH CARE EDUCATION/TRAINING PROGRAM | Admitting: STUDENT IN AN ORGANIZED HEALTH CARE EDUCATION/TRAINING PROGRAM
Payer: COMMERCIAL

## 2023-10-24 ENCOUNTER — ANESTHESIA (OUTPATIENT)
Dept: PERIOP | Facility: HOSPITAL | Age: 53
End: 2023-10-24
Payer: COMMERCIAL

## 2023-10-24 ENCOUNTER — ANESTHESIA EVENT (OUTPATIENT)
Dept: PERIOP | Facility: HOSPITAL | Age: 53
End: 2023-10-24
Payer: COMMERCIAL

## 2023-10-24 VITALS
RESPIRATION RATE: 20 BRPM | OXYGEN SATURATION: 99 % | HEIGHT: 61 IN | DIASTOLIC BLOOD PRESSURE: 73 MMHG | HEART RATE: 75 BPM | WEIGHT: 279.76 LBS | TEMPERATURE: 97.2 F | BODY MASS INDEX: 52.82 KG/M2 | SYSTOLIC BLOOD PRESSURE: 115 MMHG

## 2023-10-24 DIAGNOSIS — S83.242A ACUTE MEDIAL MENISCUS TEAR OF LEFT KNEE, INITIAL ENCOUNTER: ICD-10-CM

## 2023-10-24 DIAGNOSIS — M17.12 PRIMARY OSTEOARTHRITIS OF LEFT KNEE: ICD-10-CM

## 2023-10-24 LAB
GLUCOSE BLDC GLUCOMTR-MCNC: 114 MG/DL (ref 70–99)
GLUCOSE BLDC GLUCOMTR-MCNC: 117 MG/DL (ref 70–99)

## 2023-10-24 PROCEDURE — 29881 ARTHRS KNE SRG MNISECTMY M/L: CPT | Performed by: STUDENT IN AN ORGANIZED HEALTH CARE EDUCATION/TRAINING PROGRAM

## 2023-10-24 PROCEDURE — 25010000002 FENTANYL CITRATE (PF) 50 MCG/ML SOLUTION: Performed by: NURSE ANESTHETIST, CERTIFIED REGISTERED

## 2023-10-24 PROCEDURE — 25010000002 CEFAZOLIN PER 500 MG: Performed by: STUDENT IN AN ORGANIZED HEALTH CARE EDUCATION/TRAINING PROGRAM

## 2023-10-24 PROCEDURE — 25010000002 BUPIVACAINE (PF) 0.25 % SOLUTION: Performed by: STUDENT IN AN ORGANIZED HEALTH CARE EDUCATION/TRAINING PROGRAM

## 2023-10-24 PROCEDURE — 25010000002 SUGAMMADEX 200 MG/2ML SOLUTION: Performed by: NURSE ANESTHETIST, CERTIFIED REGISTERED

## 2023-10-24 PROCEDURE — 25810000003 LACTATED RINGERS PER 1000 ML: Performed by: ANESTHESIOLOGY

## 2023-10-24 PROCEDURE — 82948 REAGENT STRIP/BLOOD GLUCOSE: CPT

## 2023-10-24 PROCEDURE — 25010000002 ONDANSETRON PER 1 MG: Performed by: NURSE ANESTHETIST, CERTIFIED REGISTERED

## 2023-10-24 PROCEDURE — 25010000002 KETOROLAC TROMETHAMINE PER 15 MG: Performed by: NURSE ANESTHETIST, CERTIFIED REGISTERED

## 2023-10-24 PROCEDURE — 93005 ELECTROCARDIOGRAM TRACING: CPT | Performed by: STUDENT IN AN ORGANIZED HEALTH CARE EDUCATION/TRAINING PROGRAM

## 2023-10-24 PROCEDURE — 25010000002 DEXAMETHASONE PER 1 MG: Performed by: NURSE ANESTHETIST, CERTIFIED REGISTERED

## 2023-10-24 PROCEDURE — 29881 ARTHRS KNE SRG MNISECTMY M/L: CPT | Performed by: PHYSICIAN ASSISTANT

## 2023-10-24 PROCEDURE — 25010000002 MIDAZOLAM PER 1MG: Performed by: ANESTHESIOLOGY

## 2023-10-24 PROCEDURE — 25010000002 METOCLOPRAMIDE PER 10 MG: Performed by: ANESTHESIOLOGY

## 2023-10-24 PROCEDURE — 25010000002 PROPOFOL 10 MG/ML EMULSION: Performed by: NURSE ANESTHETIST, CERTIFIED REGISTERED

## 2023-10-24 RX ORDER — PROMETHAZINE HYDROCHLORIDE 25 MG/1
25 SUPPOSITORY RECTAL ONCE AS NEEDED
Status: DISCONTINUED | OUTPATIENT
Start: 2023-10-24 | End: 2023-10-24 | Stop reason: HOSPADM

## 2023-10-24 RX ORDER — SCOLOPAMINE TRANSDERMAL SYSTEM 1 MG/1
1 PATCH, EXTENDED RELEASE TRANSDERMAL ONCE
Status: DISCONTINUED | OUTPATIENT
Start: 2023-10-24 | End: 2023-10-24 | Stop reason: HOSPADM

## 2023-10-24 RX ORDER — CEFAZOLIN SODIUM IN 0.9 % NACL 3 G/100 ML
3 INTRAVENOUS SOLUTION, PIGGYBACK (ML) INTRAVENOUS ONCE
Status: COMPLETED | OUTPATIENT
Start: 2023-10-24 | End: 2023-10-24

## 2023-10-24 RX ORDER — DEXAMETHASONE SODIUM PHOSPHATE 4 MG/ML
INJECTION, SOLUTION INTRA-ARTICULAR; INTRALESIONAL; INTRAMUSCULAR; INTRAVENOUS; SOFT TISSUE AS NEEDED
Status: DISCONTINUED | OUTPATIENT
Start: 2023-10-24 | End: 2023-10-24 | Stop reason: SURG

## 2023-10-24 RX ORDER — ACETAMINOPHEN 500 MG
1000 TABLET ORAL ONCE
Status: COMPLETED | OUTPATIENT
Start: 2023-10-24 | End: 2023-10-24

## 2023-10-24 RX ORDER — MEPERIDINE HYDROCHLORIDE 25 MG/ML
12.5 INJECTION INTRAMUSCULAR; INTRAVENOUS; SUBCUTANEOUS
Status: DISCONTINUED | OUTPATIENT
Start: 2023-10-24 | End: 2023-10-24 | Stop reason: HOSPADM

## 2023-10-24 RX ORDER — CEFAZOLIN SODIUM 2 G/100ML
2 INJECTION, SOLUTION INTRAVENOUS ONCE
Status: DISCONTINUED | OUTPATIENT
Start: 2023-10-24 | End: 2023-10-24

## 2023-10-24 RX ORDER — PROPOFOL 10 MG/ML
VIAL (ML) INTRAVENOUS AS NEEDED
Status: DISCONTINUED | OUTPATIENT
Start: 2023-10-24 | End: 2023-10-24 | Stop reason: SURG

## 2023-10-24 RX ORDER — PROMETHAZINE HYDROCHLORIDE 12.5 MG/1
25 TABLET ORAL ONCE AS NEEDED
Status: DISCONTINUED | OUTPATIENT
Start: 2023-10-24 | End: 2023-10-24 | Stop reason: HOSPADM

## 2023-10-24 RX ORDER — ONDANSETRON 2 MG/ML
4 INJECTION INTRAMUSCULAR; INTRAVENOUS ONCE AS NEEDED
Status: DISCONTINUED | OUTPATIENT
Start: 2023-10-24 | End: 2023-10-24 | Stop reason: HOSPADM

## 2023-10-24 RX ORDER — MAGNESIUM HYDROXIDE 1200 MG/15ML
LIQUID ORAL AS NEEDED
Status: DISCONTINUED | OUTPATIENT
Start: 2023-10-24 | End: 2023-10-24 | Stop reason: HOSPADM

## 2023-10-24 RX ORDER — ONDANSETRON 4 MG/1
4 TABLET, FILM COATED ORAL EVERY 8 HOURS PRN
Qty: 30 TABLET | Refills: 0 | Status: SHIPPED | OUTPATIENT
Start: 2023-10-24

## 2023-10-24 RX ORDER — METOCLOPRAMIDE HYDROCHLORIDE 5 MG/ML
10 INJECTION INTRAMUSCULAR; INTRAVENOUS ONCE AS NEEDED
Status: COMPLETED | OUTPATIENT
Start: 2023-10-24 | End: 2023-10-24

## 2023-10-24 RX ORDER — LIDOCAINE HYDROCHLORIDE 20 MG/ML
INJECTION, SOLUTION EPIDURAL; INFILTRATION; INTRACAUDAL; PERINEURAL AS NEEDED
Status: DISCONTINUED | OUTPATIENT
Start: 2023-10-24 | End: 2023-10-24 | Stop reason: SURG

## 2023-10-24 RX ORDER — DOCUSATE SODIUM 100 MG/1
100 CAPSULE, LIQUID FILLED ORAL 2 TIMES DAILY PRN
Qty: 20 CAPSULE | Refills: 0 | Status: SHIPPED | OUTPATIENT
Start: 2023-10-24

## 2023-10-24 RX ORDER — ROCURONIUM BROMIDE 10 MG/ML
INJECTION, SOLUTION INTRAVENOUS AS NEEDED
Status: DISCONTINUED | OUTPATIENT
Start: 2023-10-24 | End: 2023-10-24 | Stop reason: SURG

## 2023-10-24 RX ORDER — MIDAZOLAM HYDROCHLORIDE 2 MG/2ML
2 INJECTION, SOLUTION INTRAMUSCULAR; INTRAVENOUS ONCE
Status: COMPLETED | OUTPATIENT
Start: 2023-10-24 | End: 2023-10-24

## 2023-10-24 RX ORDER — HYDROCODONE BITARTRATE AND ACETAMINOPHEN 5; 325 MG/1; MG/1
1 TABLET ORAL EVERY 4 HOURS PRN
Qty: 30 TABLET | Refills: 0 | Status: SHIPPED | OUTPATIENT
Start: 2023-10-24

## 2023-10-24 RX ORDER — SUCCINYLCHOLINE/SOD CL,ISO/PF 100 MG/5ML
SYRINGE (ML) INTRAVENOUS AS NEEDED
Status: DISCONTINUED | OUTPATIENT
Start: 2023-10-24 | End: 2023-10-24 | Stop reason: SURG

## 2023-10-24 RX ORDER — ONDANSETRON 2 MG/ML
INJECTION INTRAMUSCULAR; INTRAVENOUS AS NEEDED
Status: DISCONTINUED | OUTPATIENT
Start: 2023-10-24 | End: 2023-10-24 | Stop reason: SURG

## 2023-10-24 RX ORDER — SODIUM CHLORIDE, SODIUM LACTATE, POTASSIUM CHLORIDE, CALCIUM CHLORIDE 600; 310; 30; 20 MG/100ML; MG/100ML; MG/100ML; MG/100ML
9 INJECTION, SOLUTION INTRAVENOUS CONTINUOUS PRN
Status: DISCONTINUED | OUTPATIENT
Start: 2023-10-24 | End: 2023-10-24 | Stop reason: HOSPADM

## 2023-10-24 RX ORDER — BUPIVACAINE HYDROCHLORIDE 2.5 MG/ML
INJECTION, SOLUTION EPIDURAL; INFILTRATION; INTRACAUDAL AS NEEDED
Status: DISCONTINUED | OUTPATIENT
Start: 2023-10-24 | End: 2023-10-24 | Stop reason: HOSPADM

## 2023-10-24 RX ORDER — KETOROLAC TROMETHAMINE 30 MG/ML
INJECTION, SOLUTION INTRAMUSCULAR; INTRAVENOUS AS NEEDED
Status: DISCONTINUED | OUTPATIENT
Start: 2023-10-24 | End: 2023-10-24 | Stop reason: SURG

## 2023-10-24 RX ORDER — FENTANYL CITRATE 50 UG/ML
INJECTION, SOLUTION INTRAMUSCULAR; INTRAVENOUS AS NEEDED
Status: DISCONTINUED | OUTPATIENT
Start: 2023-10-24 | End: 2023-10-24 | Stop reason: SURG

## 2023-10-24 RX ADMIN — FENTANYL CITRATE 50 MCG: 50 INJECTION, SOLUTION INTRAMUSCULAR; INTRAVENOUS at 09:10

## 2023-10-24 RX ADMIN — DEXAMETHASONE SODIUM PHOSPHATE 4 MG: 4 INJECTION, SOLUTION INTRAMUSCULAR; INTRAVENOUS at 09:02

## 2023-10-24 RX ADMIN — KETOROLAC TROMETHAMINE 15 MG: 30 INJECTION, SOLUTION INTRAMUSCULAR; INTRAVENOUS at 09:26

## 2023-10-24 RX ADMIN — PROPOFOL 170 MG: 10 INJECTION, EMULSION INTRAVENOUS at 08:51

## 2023-10-24 RX ADMIN — ONDANSETRON 4 MG: 2 INJECTION INTRAMUSCULAR; INTRAVENOUS at 09:02

## 2023-10-24 RX ADMIN — Medication 100 MG: at 08:51

## 2023-10-24 RX ADMIN — Medication 3 G: at 08:47

## 2023-10-24 RX ADMIN — METOCLOPRAMIDE HYDROCHLORIDE 10 MG: 5 INJECTION INTRAMUSCULAR; INTRAVENOUS at 08:08

## 2023-10-24 RX ADMIN — ROCURONIUM BROMIDE 30 MG: 50 INJECTION INTRAVENOUS at 09:02

## 2023-10-24 RX ADMIN — SUGAMMADEX 200 MG: 100 INJECTION, SOLUTION INTRAVENOUS at 09:32

## 2023-10-24 RX ADMIN — SODIUM CHLORIDE, POTASSIUM CHLORIDE, SODIUM LACTATE AND CALCIUM CHLORIDE 9 ML/HR: 600; 310; 30; 20 INJECTION, SOLUTION INTRAVENOUS at 07:22

## 2023-10-24 RX ADMIN — ACETAMINOPHEN 1000 MG: 500 TABLET ORAL at 07:22

## 2023-10-24 RX ADMIN — FENTANYL CITRATE 50 MCG: 50 INJECTION, SOLUTION INTRAMUSCULAR; INTRAVENOUS at 08:54

## 2023-10-24 RX ADMIN — LIDOCAINE HYDROCHLORIDE 100 MG: 20 INJECTION, SOLUTION EPIDURAL; INFILTRATION; INTRACAUDAL; PERINEURAL at 08:50

## 2023-10-24 RX ADMIN — MIDAZOLAM HYDROCHLORIDE 2 MG: 1 INJECTION, SOLUTION INTRAMUSCULAR; INTRAVENOUS at 08:08

## 2023-10-24 RX ADMIN — SCOPALAMINE 1 PATCH: 1 PATCH, EXTENDED RELEASE TRANSDERMAL at 07:22

## 2023-10-24 NOTE — ANESTHESIA POSTPROCEDURE EVALUATION
Patient: Serenity Fleming    Procedure Summary       Date: 10/24/23 Room / Location: [facility]  / [facility]    Anesthesia Start: 0847 Anesthesia Stop: 0941    Procedure: KNEE ARTHROSCOPY WITH PARTIAL MEDIAL MENISCECTOMY, CHONDROPLASTY (Left) Diagnosis:       Acute medial meniscus tear of left knee, initial encounter      Primary osteoarthritis of left knee      (Acute medial meniscus tear of left knee, initial encounter [S83.242A])      (Primary osteoarthritis of left knee [M17.12])    Surgeons: Simon Hwang MD Provider: Fahad Baptiste MD    Anesthesia Type: general ASA Status: 3            Anesthesia Type: general    Vitals  Vitals Value Taken Time   /78 10/24/23 0945   Temp 36.2 °C (97.2 °F) 10/24/23 0940   Pulse 80 10/24/23 0947   Resp 20 10/24/23 0945   SpO2 93 % 10/24/23 0947   Vitals shown include unfiled device data.        Post Anesthesia Care and Evaluation    Patient location during evaluation: bedside  Patient participation: complete - patient participated  Level of consciousness: awake  Pain score: 2  Pain management: adequate    Airway patency: patent  PONV Status: none  Cardiovascular status: acceptable and stable  Respiratory status: acceptable  Hydration status: acceptable    Comments: An Anesthesiologist personally participated in the most demanding procedures (including induction and emergence if applicable) in the anesthesia plan, monitored the course of anesthesia administration at frequent intervals and remained physically present and available for immediate diagnosis and treatment of emergencies.

## 2023-10-24 NOTE — INTERVAL H&P NOTE
H&P reviewed. The patient was examined and there are no changes to the H&P.    I have seen and examined the above patient and agree with the plan.     Cardiac: Intact peripheral pulses  Pulmonary: Nonlabored respirations on room air.   Left lower extremity: Skin intact.  Distal neurovascular intact.      We reviewed the risks, benefits, indications, and alternatives to left knee arthroscopy as noted above.  The patient elected to proceed with surgery and consent was obtained.    Electronically signed by Simon Hwang MD, 10/24/23, 6:51 AM EDT.

## 2023-10-24 NOTE — DISCHARGE INSTRUCTIONS
[facility]  Postop Instructions  Outpatient Knee Surgery    DIET  Begin with clear liquids and light foods (jello, soups, etc).  Progress to your normal diet if you are not nauseated.  Take pain medicine with food - crackers, bread, etc.    WOUND CARE  Maintain your operative dressing, loosen bandage if swelling or tingling of the ankle or toes.  It is normal for the knee to bleed and swell from the surgery site.  If blood soaks onto the bandage, do not become alarmed; reinforce with additional dressing.  If blood saturates more than 2 bandages call [facility].  Remove surgical dressing on the 2nd post-operative day.  If minimal drainage is present, apply bandaids over incisions.  Do not use antibiotic ointment.  To avoid infection, keep surgical incisions clean and dry.  You may shower on the 2nd day.  No immersion of operative leg until instructed by staff.    MEDICATIONS  Pain medication is injected into the wound and knee joint during surgery.  This will wear off within 8-12 hours.  Aleve 500mg 2 times per day may be taken for pain if you do not have a history of bleeding or ulcers.  Some patients will require narcotic pain medication for a short period of time.  This can be taken as per directions on the bottle.  Potential narcotic side effects include: constipation, nausea, vomiting, sleepiness.  If nausea and vomiting continue for more than 12-24 hours, contact the office to have your medication changed.  Do not drive a car or operate machinery while taking the prescription pain medication.  Increase vegetables, whole grains, and water intake to decrease risk of constipation related to pain medications.  Drink a full glass of water with every dose of medication (prescription or over the counter) and take with food.    ACTIVITY  When sleeping or resting, elevate the leg with the support of a few pillows at the ankle to reduce swelling and pain.  Do not engage in impact activities which increase  pain/swelling over the first 7-10 days following surgery.  Avoid long periods of sitting or long distance traveling for 2 weeks.  No driving or operating heavy machinery until you are off all prescription pain medications.  You may return to sedentary work or school 1-3 days after surgery, if pain is tolerable.          WEIGHT BEARING and RANGE OF MOTION  Meniscectomy / chondroplasty: Weight bearing as tolerated.      ICE THERAPY  Begin icing immediately after surgery using the compression machine or cold packs.    EXERCISE (see below)  Knee flexion as tolerated 5-10 minutes, 4 times per day.  Begin knee exercises the following day after surgery unless otherwise instructed by the surgeon  Towel Roll extensions 3 times per day for 20 minutes  Knee Flexion Progression 3 times per day  Straight Leg Raises- Hold for 2 seconds, repeat 10 reps, 3 times per day  You may also begin ankle and foot range of motion on the first post-operative day about 2-3 times per day.            Straight Leg Raises                                        Towel Extensions                           EMERGENCIES  Contact [facility] if any of the following are present:  Painful swelling or numbness, tingling, color change, or coolness in the ankle or foot  Unrelenting pain  Fever over 101 (It is normal to have a low grad fever for the first day or two following surgery)  Redness or worsening pain around incisions  Continuous drainage or bleeding from incision (a small amount of drainage is expected)  Difficulty breathing, wheezing  Excessive nausea/vomiting causing inability to keep anything down for 12-24 hours  Inability to urinate    WHAT TO EXPECT AT YOUR FIRST POST OPERATIVE VISIT  Suture/stitches will be removed and new bandage applied.  Follow up Xrays will be taken if indicated.  Next follow-up visit will usually be scheduled for 4-6 weeks

## 2023-10-24 NOTE — OP NOTE
KNEE ARTHROSCOPY WITH MENISCECTOMY  Procedure Report    Patient Name:  Serenity Fleming  YOB: 1970    Date of Surgery:  10/24/2023     Indications:  Serenity is a 53-year-old female with a history of left knee pain.  She had an MRI which revealed chondrosis and a medial meniscus tear.  We discussed treatment options.  We discussed both operative and nonoperative management.  We discussed the risks, benefits, indications, alternatives to left knee arthroscopy with partial medial meniscectomy and chondroplasty.  She elected to proceed with surgical management and consent was obtained.    Pre-op Diagnosis:   Acute medial meniscus tear of left knee, initial encounter [S83.242A]  Primary osteoarthritis of left knee [M17.12]       Post-Op Diagnosis Codes:     * Acute medial meniscus tear of left knee, initial encounter [S83.242A]     * Primary osteoarthritis of left knee [M17.12]    Procedure/CPT® Codes:      Procedure(s):  KNEE ARTHROSCOPY WITH PARTIAL MEDIAL MENISCECTOMY, CHONDROPLASTY    Staff:  Surgeon(s):  Simon Hwang MD    Assistant: Nabila Duff PA-C    Anesthesia: General    Estimated Blood Loss: 5 mL    Implants:    Nothing was implanted during the procedure    Specimen:          None        Findings: Grade 3 and central grade 4 chondrosis patella, grade 3 chondrosis medial femoral condyle, complex tear medial meniscus posterior root, grade 2 chondrosis lateral compartment, intact lateral meniscus    Complications: None    Description of Procedure: The patient was met in the preoperative holding area and the operative extremity was marked.  The patient was transported to the operating room and laid supine on the operating room table.  General anesthesia was induced without complication.  An upper thigh tourniquet was applied.  The left leg was placed into a leg hernandez and the foot of the table was dropped.  All extremities were well padded.  3 g of preoperative Ancef were administered.  The  left lower extremity was then prepped and draped in the usual sterile fashion.  A timeout was taken to ensure the appropriate patient, procedure, and procedural site.  All were in agreement.  The left lower extremity was exsanguinated and the tourniquet was inflated to 300 mmHg.  I began by marking the superficial landmarks over the anterior aspect of the left knee.  A vertical incision for a standard lateral portal was created and the arthroscope was inserted into the patellofemoral compartment.  Grade 3 and central grade 4 chondrosis was noted on the patella.  Unstable cartilage was identified.  The arthroscope was transitioned into the lateral gutter and no loose bodies were identified.  The arthroscope was transitioned into the medial gutter and no loose bodies were identified.  The arthroscope was then transitioned into the medial compartment.  A vertical incision for a medial working portal was created after needle localization.  The arthroscopic shaver was introduced and the fat pad was debrided.  A probe was introduced and the medial compartment was evaluated.  Grade 3 chondrosis was noted over the medial femoral condyle.  There were unstable cartilage lesions.  I utilized the arthroscopic shaver in chondroplasty fashion to remove the unstable cartilage.  A complex tear of the medial meniscus posterior horn and root was also identified.  I used the arthroscopic meniscal biter and shaver to perform a partial medial meniscectomy taking this tear back to a stable border.  I removed the root and tapered the tear back into the meniscal body.  I was satisfied with the work in the medial compartment.  The arthroscope was transitioned into the notch.  The ACL and PCL were identified and were intact.  I then transitioned into the lateral compartment.  Grade 2 chondral changes were noted on the lateral tibial plateau.  The lateral meniscus was intact and stable to probe.  I then transitioned back into the  patellofemoral compartment.  I utilized the arthroscopic shaver to perform a chondroplasty on the patella, removing the unstable cartilage that was previous identified.  The knee was then thoroughly irrigated and the arthroscopic instrumentation was removed.  The knee was drained of arthroscopic fluid.  Portal sites were closed with 4-0 nylon in interrupted fashion.  I injected 20 cc of 0.5% Marcaine into the portal sites.  The wound was dressed with Xeroform, fluffs, soft roll, and an Ace wrap from the foot to the proximal thigh.  The patient awoke from anesthesia without complication and was transferred to the recovery room in stable condition.  All surgical counts were correct.  The patient had a palpable dorsalis pedis pulse prior to leaving the operating room.    Assistant: Nabila Duff PA-C  was responsible for performing the following activities: Retraction, Suction, Irrigation, Suturing, Closing, and Placing Dressing and their skilled assistance was necessary for the success of this case.    Simon Hwang MD     Date: 10/24/2023  Time: 09:32 EDT

## 2023-10-24 NOTE — ANESTHESIA PREPROCEDURE EVALUATION
Anesthesia Evaluation     Patient summary reviewed and Nursing notes reviewed   history of anesthetic complications:  PONV  NPO Solid Status: > 8 hours  NPO Liquid Status: > 2 hours           Airway   Mallampati: III  TM distance: >3 FB  Neck ROM: full  No difficulty expected and Small opening  Dental      Pulmonary - negative pulmonary ROS and normal exam    breath sounds clear to auscultation  Cardiovascular - normal exam  Exercise tolerance: good (4-7 METS)    Rhythm: regular  Rate: normal    (+) hypertension, hyperlipidemia      Neuro/Psych  (+) psychiatric history  GI/Hepatic/Renal/Endo    (+) GERD, diabetes mellitus type 2    Musculoskeletal     Abdominal    Substance History - negative use     OB/GYN negative ob/gyn ROS         Other   arthritis,     ROS/Med Hx Other: PAT Nursing Notes unavailable.                     Anesthesia Plan    ASA 3     general     (Patient understands anesthesia not responsible for dental damage.    Pt was on mounjaro, last dose Friday before last, will give reglan, scop, pt took omeprazole)  intravenous induction     Anesthetic plan, risks, benefits, and alternatives have been provided, discussed and informed consent has been obtained with: patient.    Use of blood products discussed with patient .    Plan discussed with CRNA.        CODE STATUS:

## 2023-10-25 LAB
QT INTERVAL: 391 MS
QTC INTERVAL: 444 MS

## 2023-11-06 ENCOUNTER — OFFICE VISIT (OUTPATIENT)
Dept: ORTHOPEDIC SURGERY | Facility: CLINIC | Age: 53
End: 2023-11-06
Payer: COMMERCIAL

## 2023-11-06 VITALS — SYSTOLIC BLOOD PRESSURE: 130 MMHG | OXYGEN SATURATION: 96 % | HEART RATE: 84 BPM | DIASTOLIC BLOOD PRESSURE: 75 MMHG

## 2023-11-06 DIAGNOSIS — Z98.890 S/P ARTHROSCOPIC PARTIAL MEDIAL MENISCECTOMY: Primary | ICD-10-CM

## 2023-11-06 PROCEDURE — 99024 POSTOP FOLLOW-UP VISIT: CPT | Performed by: PHYSICIAN ASSISTANT

## 2023-11-06 NOTE — PROGRESS NOTES
Chief Complaint  Follow-up of the Left Knee    Subjective          Serenity Fleming presents to [facility]   History of Present Illness     Serenity Fleming presents today for a follow up of her left knee. Patient is 2 weeks post operative left knee arthroscopy with partial medial meniscectomy and chondroplasty performed by Dr. Hwang on 10/24/2023. Today, patient states that she she has been performing her home exercises on her range of motion.  She states that her swelling has been improving using ice.  She reports pain with ambulation.  She is not taking her narcotic medications and has been taking ibuprofen as needed.  She does report some numbness to the lateral portion of the knee but denies any lower extremity numbness or tingling.  She states that the numbness she has has been improving.  She denies palpitations, dyspnea or drainage from her incision sites.  She does report a slight fever the first day postop but states this resolved and she has not had fevers or chills signs.        Allergies   Allergen Reactions    Codeine Shortness Of Breath    Ciprofloxacin Dizziness        Social History     Socioeconomic History    Marital status:    Tobacco Use    Smoking status: Never     Passive exposure: Never    Smokeless tobacco: Never    Tobacco comments:     Past childhood smoke exposure , no current.    Vaping Use    Vaping Use: Never used   Substance and Sexual Activity    Alcohol use: No    Drug use: No    Sexual activity: Defer     Partners: Male     Birth control/protection: Post-menopausal, None        I reviewed the patient's chief complaint, history of present illness, review of systems, past medical history, surgical history, family history, social history, medications, and allergy list.     REVIEW OF SYSTEMS    Constitutional: Denies fevers, chills, weight loss  Cardiovascular: Denies chest pain, shortness of breath  Skin: Denies rashes, acute skin  changes  Neurologic: Denies headache, loss of consciousness  MSK: Left knee pain      Objective   Vital Signs:   /75   Pulse 84   SpO2 96%     There is no height or weight on file to calculate BMI.    Physical Exam    General: Alert, no acute distress  Left lower extremity: Sutures removed today without complications.  Well-healing arthroscopy portals about the left knee. No redness or active drainage noted.  No concerning signs for infection.  No knee effusion.  110 degrees of flexion.  Full knee extension. Knee extensor mechanism intact. Knee stable to varus and valgus stress. Calf soft, nontender. Demonstrates active ankle plantarflexion and dorsiflexion. Sensation intact over the dorsal and plantar foot.  Palpable pedal pulses.        Procedures    Imaging Results (Most Recent)       None                     Assessment and Plan    Diagnoses and all orders for this visit:    1. S/P arthroscopic partial medial meniscectomy (Primary)         Serenity Fleming presents today 2 weeks postop left knee arthroscopy with partial medial meniscectomy and chondroplasty performed by Dr. Hwang on 10/24/2023.  Sutures removed today without complications.  Incisions are well-healing.  No active drainage or redness noted.  No concerning signs of infection.  Patient is doing well and denies fever or chills.  Incision site care was reviewed today. Patient instructed not to soak or submerge incision. Do not apply any lotions, creams, or ointments to the incision at this time.  We discussed concerning signs and symptoms regarding the incision site.  Patient understood and agreed. We discussed formal physical therapy versus home exercises.  Patient understood and elected to proceed with home exercises at this time.  No home exercises provided today.  We discussed the importance of these exercises to improve range of motion and strength.  Patient understood and agreed.  Continue with ibuprofen as needed.  Use ice and elevation  as needed for inflammation.    Patient will follow up in 4 weeks for reevaluation. No new x-rays needed at next visit.       Call or return if symptoms worsen or patient has any concerns.       Follow Up   Return in about 4 weeks (around 12/4/2023).  Patient was given instructions and counseling regarding her condition or for health maintenance advice. Please see specific information pulled into the AVS if appropriate.     Nabila Duff PA-C  11/06/23  09:02 EST

## 2023-11-09 ENCOUNTER — CLINICAL SUPPORT (OUTPATIENT)
Dept: GASTROENTEROLOGY | Facility: CLINIC | Age: 53
End: 2023-11-09
Payer: COMMERCIAL

## 2023-11-09 ENCOUNTER — PREP FOR SURGERY (OUTPATIENT)
Dept: OTHER | Facility: HOSPITAL | Age: 53
End: 2023-11-09
Payer: COMMERCIAL

## 2023-11-09 DIAGNOSIS — Z12.11 COLON CANCER SCREENING: Primary | ICD-10-CM

## 2023-11-09 RX ORDER — SODIUM, POTASSIUM,MAG SULFATES 17.5-3.13G
1 SOLUTION, RECONSTITUTED, ORAL ORAL EVERY 12 HOURS
Qty: 354 ML | Refills: 0 | Status: SHIPPED | OUTPATIENT
Start: 2023-11-09

## 2023-11-09 NOTE — PROGRESS NOTES
Serenity Fleming  1970  53 y.o.    Reason for call: Screening Colonoscopy  Prep prescribed: Suprep  Prep instructions reviewed with patient and sent to patient via [employer]  Is the patient currently on any injectable medications for weight loss or diabetes? Yes, pt is aware of suspension instructions   Clearance needed? No  If yes, what clearance is needed? N/A  Clearance has been requested from NA   The patient has been scheduled for: Colonoscopy  After your procedure, you will be contacted with results. If you're unavailable, is there anyone you would like us to speak to on your behalf? Jose Alberto Bernadette-    Family history of colon cancer? No  If yes, indicate relative: Na   Family History   Problem Relation Age of Onset    Hypertension Mother     Diabetes Mother     Kidney disease Mother     Cancer Father     Lymphoma Father         malignant     Hypertension Sister     Hypertension Brother     No Known Problems Daughter     No Known Problems Son     Hypertension Maternal Aunt     Stomach cancer Paternal Aunt         malignant    No Known Problems Maternal Grandmother     Hypertension Maternal Grandfather     No Known Problems Paternal Grandmother     Breast cancer Other 50    BRCA 1/2 Neg Hx     Colon cancer Neg Hx     Endometrial cancer Neg Hx     Ovarian cancer Neg Hx     Malig Hyperthermia Neg Hx      Past Medical History:   Diagnosis Date    Acute medial meniscus tear     LEFT    Allergic     Allergic to Codeine    Anemia     NO CURRENT ISSUES    B12 deficiency 09/13/2022    Claustrophobia     Colon polyps     Essential hypertension     GERD (gastroesophageal reflux disease)     Glaucoma     Hyperlipidemia 09/13/2022    Hypertension     PCOS (polycystic ovarian syndrome) 09/13/2022    PONV (postoperative nausea and vomiting)     Type 2 diabetes mellitus without complication, without long-term current use of insulin 09/13/2022    NO OTHER MEDS, DIET AND EXERCISE TO MANAGE    Vitamin D deficiency  2022     Allergies   Allergen Reactions    Codeine Shortness Of Breath    Ciprofloxacin Dizziness     Past Surgical History:   Procedure Laterality Date    ADENOIDECTOMY       SECTION      CHOLECYSTECTOMY      DILATATION AND CURETTAGE      ENDOMETRIAL ABLATION      ENDOSCOPY  2019    KNEE ARTHROSCOPY W/ MENISCECTOMY Left 10/24/2023    Procedure: KNEE ARTHROSCOPY WITH PARTIAL MEDIAL MENISCECTOMY, CHONDROPLASTY;  Surgeon: Simon Hwang MD;  Location: [facility];  Service: Orthopedics;  Laterality: Left;    KNEE CARTILAGE SURGERY Right     RIGHT MENISCUS SURGERY    ROTATOR CUFF REPAIR Right     TONSILLECTOMY       Social History     Socioeconomic History    Marital status:    Tobacco Use    Smoking status: Never     Passive exposure: Never    Smokeless tobacco: Never    Tobacco comments:     Past childhood smoke exposure , no current.    Vaping Use    Vaping Use: Never used   Substance and Sexual Activity    Alcohol use: No    Drug use: No    Sexual activity: Defer     Partners: Male     Birth control/protection: Post-menopausal, None       Current Outpatient Medications:     albuterol sulfate  (90 Base) MCG/ACT inhaler, Inhale 2 puffs Every 4 (Four) Hours As Needed for Wheezing., Disp: 6.7 g, Rfl: 0    Cholecalciferol (vitamin D3) 125 MCG (5000 UT) capsule capsule, Take 4,000 Units by mouth Daily. LAST DOSES 10/20/23, Disp: , Rfl:     docusate sodium (COLACE) 100 MG capsule, Take 1 capsule by mouth 2 (Two) Times a Day As Needed for Constipation. (Patient not taking: Reported on 2023), Disp: 20 capsule, Rfl: 0    hydroCHLOROthiazide (MICROZIDE) 12.5 MG capsule, TAKE 1 CAPSULE BY MOUTH EVERY DAY (Patient taking differently: Take 1 capsule by mouth Every Morning. LAST DOSE 10/23/23), Disp: 90 capsule, Rfl: 1    HYDROcodone-acetaminophen (NORCO) 5-325 MG per tablet, Take 1 tablet by mouth Every 4 (Four) Hours As Needed for Severe Pain. (Patient not taking: Reported on 2023), Disp:  30 tablet, Rfl: 0    losartan (COZAAR) 25 MG tablet, TAKE 1 TABLET BY MOUTH EVERY DAY (Patient taking differently: Take 1 tablet by mouth Every Night. LAST DOSE 10/23/23), Disp: 90 tablet, Rfl: 0    Multiple Vitamins-Minerals (MULTIVITAMIN ADULT PO), Take 1 tablet by mouth Daily. LAST DOSE 10/20/23, Disp: , Rfl:     nystatin (MYCOSTATIN) 649770 UNIT/GM cream, Apply 1 application topically to the appropriate area as directed As Needed (skin yeast)., Disp: 60 g, Rfl: 3    nystatin (MYCOSTATIN) 481726 UNIT/GM powder, Apply  topically to the appropriate area as directed 2 (Two) Times a Day., Disp: 60 g, Rfl: 3    omeprazole (priLOSEC) 20 MG capsule, Take 1 capsule by mouth Daily., Disp: 90 capsule, Rfl: 3    ondansetron (Zofran) 4 MG tablet, Take 1 tablet by mouth Every 8 (Eight) Hours As Needed for Nausea or Vomiting. (Patient not taking: Reported on 11/6/2023), Disp: 30 tablet, Rfl: 0    Tirzepatide (Mounjaro) 5 MG/0.5ML solution pen-injector, Inject 0.5 mL under the skin into the appropriate area as directed Every 7 (Seven) Days. (Patient taking differently: Inject 0.5 mL under the skin into the appropriate area as directed Every 7 (Seven) Days. HOLDING, LAST DOSE 10/13/23), Disp: 2 mL, Rfl: 0

## 2023-11-14 ENCOUNTER — TELEPHONE (OUTPATIENT)
Dept: FAMILY MEDICINE CLINIC | Facility: CLINIC | Age: 53
End: 2023-11-14
Payer: COMMERCIAL

## 2023-11-14 NOTE — TELEPHONE ENCOUNTER
Caller: Serenity Fleming    Relationship: Self    Best call back number: [phone]     Who are you requesting to speak with (clinical staff, provider,  specific staff member): CLINICAL    What was the call regarding: EKG ON 10/24/23 -  PATIENT REPORTS IT SHOWS SOME ABNORMALITIES AND NO ONE HAS CONTACTED HER ABOUT IT, AND SHE WANTS TO MAKE SURE THAT THERE IS NOTHING FOR HER TO BE CONCERNED WITH.    PLEASE ADVISE

## 2023-11-14 NOTE — TELEPHONE ENCOUNTER
Patient had EKG done 10/24/23 and is asking if you can review this and let her know if there is anything she should be concerned about?  She states it showed some abnormalities.

## 2023-11-16 NOTE — TELEPHONE ENCOUNTER
I reviewed the patient's EKG.  She is in normal sinus rhythm.  VA interval as well as QTc normal.  No sign of any old heart attacks.  Not overly concerned by her EKG.

## 2023-11-21 RX ORDER — TIRZEPATIDE 5 MG/.5ML
0.5 INJECTION, SOLUTION SUBCUTANEOUS
Qty: 2 ML | Refills: 0 | Status: SHIPPED | OUTPATIENT
Start: 2023-11-21

## 2023-11-21 NOTE — TELEPHONE ENCOUNTER
Caller: Serenity Fleming MIRIAM    Relationship: Self    Best call back number: [phone]    Requested Prescriptions:   Requested Prescriptions     Pending Prescriptions Disp Refills    Tirzepatide (Mounjaro) 5 MG/0.5ML solution pen-injector 2 mL 0     Sig: Inject 0.5 mL under the skin into the appropriate area as directed Every 7 (Seven) Days.        Pharmacy where request should be sent: [facility]/PHARMACY #[id number] - [place] - [address] [facility] [phone] [facility] [phone] FX     Last office visit with prescribing clinician: 9/21/2023   Last telemedicine visit with prescribing clinician: Visit date not found   Next office visit with prescribing clinician: 12/26/2023     Additional details provided by patient: PATIENT HAS ONE DOSE LEFT FOR TOMORROW AND THEN SHE WILL NEED A REFILL AND SHE HAS TOLERATED THIS DOSAGE AND IS READY FOR AN INCREASE IN DOSAGE IF DR. BOURGEOIS DETERMINES IT IS NEEDED     Does the patient have less than a 3 day supply:  [] Yes  [x] No    Would you like a call back once the refill request has been completed: [] Yes [x] No    If the office needs to give you a call back, can they leave a voicemail: [] Yes [x] No    Ryan Do Rep   11/21/23 13:27 EST            English

## 2023-12-08 DIAGNOSIS — E11.9 TYPE 2 DIABETES MELLITUS WITHOUT COMPLICATION, WITHOUT LONG-TERM CURRENT USE OF INSULIN: ICD-10-CM

## 2023-12-08 DIAGNOSIS — Z00.00 ANNUAL PHYSICAL EXAM: Primary | ICD-10-CM

## 2023-12-08 DIAGNOSIS — E78.2 MIXED HYPERLIPIDEMIA: ICD-10-CM

## 2023-12-08 NOTE — TELEPHONE ENCOUNTER
Caller: Serenity Fleming    Relationship: Self    Best call back number: [phone]     What orders are you requesting (i.e. lab or imaging): ACTIVE LAB ORDERS     In what timeframe would the patient need to come in: PRIOR TO APPOINTMENT ON 01/23/2023     Where will you receive your lab/imaging services: IN OFFICE     Additional notes: PATIENT IS CALLING REQUESTING ACTIVE LAB ORDERS PRIOR TO APPOINTMENT ON 01/23/2023, AND CLARIFICATION IF ORDERS NEED TO BE FASTING OR NOT.

## 2023-12-08 NOTE — TELEPHONE ENCOUNTER
LOV:09/21/2023  NOV:01/23/2024   Labs pended      Patient also wants mounjaro filled, do you want to increase or stay the same.

## 2023-12-14 ENCOUNTER — OFFICE VISIT (OUTPATIENT)
Dept: OBSTETRICS AND GYNECOLOGY | Age: 53
End: 2023-12-14
Payer: COMMERCIAL

## 2023-12-14 ENCOUNTER — HOSPITAL ENCOUNTER (OUTPATIENT)
Facility: HOSPITAL | Age: 53
Discharge: HOME | End: 2023-12-14
Admitting: OBSTETRICS & GYNECOLOGY
Payer: COMMERCIAL

## 2023-12-14 VITALS — WEIGHT: 272 LBS | BODY MASS INDEX: 51.35 KG/M2 | HEIGHT: 61 IN

## 2023-12-14 DIAGNOSIS — Z12.31 ENCOUNTER FOR SCREENING MAMMOGRAM FOR MALIGNANT NEOPLASM OF BREAST: ICD-10-CM

## 2023-12-14 DIAGNOSIS — Z11.51 SCREENING FOR HUMAN PAPILLOMAVIRUS (HPV): ICD-10-CM

## 2023-12-14 DIAGNOSIS — Z01.419 WELL FEMALE EXAM WITH ROUTINE GYNECOLOGICAL EXAM: ICD-10-CM

## 2023-12-14 DIAGNOSIS — Z12.4 SCREENING FOR MALIGNANT NEOPLASM OF CERVIX: ICD-10-CM

## 2023-12-14 DIAGNOSIS — Z01.419 ENCOUNTER FOR GYNECOLOGICAL EXAMINATION WITHOUT ABNORMAL FINDING: Primary | ICD-10-CM

## 2023-12-14 PROCEDURE — 77063 BREAST TOMOSYNTHESIS BI: CPT

## 2023-12-14 PROCEDURE — 77067 SCR MAMMO BI INCL CAD: CPT

## 2023-12-14 NOTE — PROGRESS NOTES
Routine Annual Visit    2023    Patient: Serenity Fleming          MR#:[id number]      Chief Complaint   Patient presents with    Gynecologic Exam     Annual Exam - last pap 20 neg, mammo today, colonoscopy 18 & has one scheduled in January, pt has no complaints today       History of Present Illness    53 y.o. female  who presents for annual exam.     Patient is feeling well today  No vaginal bleeding  Pap is due  Mammogram today  Colonoscopy is up-to-date  No complaints      No LMP recorded. Patient has had an ablation.  Obstetric History:  OB History          1    Para   0    Term   0            AB        Living             SAB        IAB        Ectopic        Molar        Multiple        Live Births                   Menstrual History:     No LMP recorded. Patient has had an ablation.       Sexual History:       ________________________________________  Patient Active Problem List   Diagnosis    Anemia    Esophageal reflux    Essential hypertension    PCOS (polycystic ovarian syndrome)    Type 2 diabetes mellitus without complication, without long-term current use of insulin    Hyperlipidemia    Vitamin D deficiency    B12 deficiency    Acute medial meniscus tear of left knee    Primary osteoarthritis of left knee    Colon cancer screening       Past Medical History:   Diagnosis Date    Acute medial meniscus tear     LEFT    Allergic     Allergic to Codeine    Anemia     NO CURRENT ISSUES    B12 deficiency 2022    Claustrophobia     Colon polyps     Essential hypertension     GERD (gastroesophageal reflux disease)     Gestational hypertension 2001    Glaucoma     Hyperlipidemia 2022    Hypertension     PCOS (polycystic ovarian syndrome) 2022    Polycystic ovary syndrome     PONV (postoperative nausea and vomiting)     Type 2 diabetes mellitus without complication, without long-term current use of insulin 2022    NO OTHER MEDS, DIET AND  "EXERCISE TO MANAGE    Varicella 1974    Vitamin D deficiency 2022       Past Surgical History:   Procedure Laterality Date    ADENOIDECTOMY       SECTION      CHOLECYSTECTOMY      COLONOSCOPY      DILATATION AND CURETTAGE      ENDOMETRIAL ABLATION      ENDOSCOPY  2019    KNEE ARTHROSCOPY W/ MENISCECTOMY Left 10/24/2023    Procedure: KNEE ARTHROSCOPY WITH PARTIAL MEDIAL MENISCECTOMY, CHONDROPLASTY;  Surgeon: Simon Hwang MD;  Location: [facility] OR [facility];  Service: Orthopedics;  Laterality: Left;    KNEE CARTILAGE SURGERY Right     RIGHT MENISCUS SURGERY    LAPAROSCOPIC CHOLECYSTECTOMY      ROTATOR CUFF REPAIR Right     TONSILLECTOMY      UPPER GASTROINTESTINAL ENDOSCOPY         Social History     Tobacco Use   Smoking Status Never    Passive exposure: Never   Smokeless Tobacco Never   Tobacco Comments    Past childhood smoke exposure , no current.        has a current medication list which includes the following prescription(s): vitamin d3, hydrochlorothiazide, losartan, multiple vitamin, nystatin, nystatin, omeprazole, tirzepatide, albuterol sulfate hfa, docusate sodium, hydrocodone-acetaminophen, ondansetron, and sodium-potassium-magnesium sulfates.  ________________________________________    Current contraception: post menopausal status  History of abnormal Pap smear: no  Family history of Breast cancer: no  Family history of uterine or ovarian cancer: no  Family History of colon cancer/colon polyps: no  History of abnormal mammogram: no      The following portions of the patient's history were reviewed and updated as appropriate: allergies, current medications, past family history, past medical history, past social history, past surgical history, and problem list.    Review of Systems    Pertinent items are noted in HPI.     Objective   Physical Exam    Ht 154.9 cm (61\")   Wt 123 kg (272 lb)   BMI 51.39 kg/m²    BP Readings from Last 3 Encounters:   23 130/75   10/24/23 115/73   10/16/23 " "120/79      Wt Readings from Last 3 Encounters:   12/14/23 123 kg (272 lb)   10/24/23 127 kg (279 lb 12.2 oz)   10/16/23 127 kg (280 lb)      BMI: Estimated body mass index is 51.39 kg/m² as calculated from the following:    Height as of this encounter: 154.9 cm (61\").    Weight as of this encounter: 123 kg (272 lb).      General:   alert, appears stated age, and cooperative   Abdomen: soft, non-tender, without masses or organomegaly   Breast: inspection negative, no nipple discharge or bleeding, no masses or nodularity palpable   Vulva: normal   Vagina: normal mucosa   Cervix: no cervical motion tenderness and no lesions   Uterus: normal size, mobile, and non-tender   Adnexa: no mass, fullness, tenderness     Assessment:    1. Normal annual exam   Assessment     ICD-10-CM ICD-9-CM   1. Encounter for gynecological examination without abnormal finding  Z01.419 V72.31   2. Well female exam with routine gynecological exam  Z01.419 V72.31   3. Screening for human papillomavirus (HPV)  Z11.51 V73.81   4. Screening for malignant neoplasm of cervix  Z12.4 V76.2     Plan:    Plan     [x]  Mammogram request made  [x]  PAP done  []  Labs:   []  GC/Chl/TV  []  DEXA scan   []  Referral for colonoscopy:       Diagnoses and all orders for this visit:    1. Encounter for gynecological examination without abnormal finding (Primary)    2. Well female exam with routine gynecological exam  -     IGP, Apt HPV,rfx 16 / 18,45    3. Screening for human papillomavirus (HPV)  -     IGP, Apt HPV,rfx 16 / 18,45    4. Screening for malignant neoplasm of cervix  -     IGP, Apt HPV,rfx 16 / 18,45            Counseling:  --Nutrition: Stressed importance of moderation and caloric balance, stressed fresh fruit and vegetables  --Exercise: Stressed the importance of regular exercise. 3-5 times weekly   - Discussed screening mammogram recommendations.   --Discussed benefits of screening colonoscopy- age 45 unless FH  --Discussed pap smear screening " recommendations

## 2023-12-15 DIAGNOSIS — Z12.31 VISIT FOR SCREENING MAMMOGRAM: Primary | ICD-10-CM

## 2023-12-19 ENCOUNTER — TELEPHONE (OUTPATIENT)
Dept: GASTROENTEROLOGY | Facility: CLINIC | Age: 53
End: 2023-12-19
Payer: COMMERCIAL

## 2023-12-22 ENCOUNTER — TELEPHONE (OUTPATIENT)
Dept: FAMILY MEDICINE CLINIC | Facility: CLINIC | Age: 53
End: 2023-12-22
Payer: COMMERCIAL

## 2023-12-22 NOTE — TELEPHONE ENCOUNTER
Caller: Serenity Fleming    Relationship: Self    Best call back number: [phone]     What medication are you requesting: MOUNJARO - 3 MONTH SUPPLY    What are your current symptoms: PATIENT STATES HER INSURANCE INFORMED HER THAT A 3 MONTH SUPPLY OF THE MOUNJARO WOULD NEED TO BE ORDERED IN THE FUTURE, AND PATIENT STATES SHE WOULD LIKE TO KNOW IF HER NEXT PRESCRIPTION OF THE MEDICATION WILL BE THE SAME DOSAGE OR A HIGHER ONE.    If a prescription is needed, what is your preferred pharmacy and phone number: [facility]/PHARMACY #[id number] - [place] - [address] - [phone]  - [phone] FX

## 2023-12-25 NOTE — TELEPHONE ENCOUNTER
I can send in 3 months of 7.5 mg if she would like? Just let me know if she is tolderating 5 mg ok?

## 2023-12-28 ENCOUNTER — TELEPHONE (OUTPATIENT)
Dept: GASTROENTEROLOGY | Facility: CLINIC | Age: 53
End: 2023-12-28
Payer: COMMERCIAL

## 2024-01-03 ENCOUNTER — TELEPHONE (OUTPATIENT)
Dept: GASTROENTEROLOGY | Facility: CLINIC | Age: 54
End: 2024-01-03
Payer: COMMERCIAL

## 2024-01-03 NOTE — TELEPHONE ENCOUNTER
"Patient called the office with some concerns about the Suprep that she was prescribed for her scope on 01/11/24. Patient verified her information and stated \"I was prescribed Suprep for my colonoscopy and I have been doing some research about this and every review that I had read has said that everyone has been violently ill when they had to take it, like vomiting and just sick. I already have stomach issues and I don't want to take anything that is going to make me throw up all night. I did fine with the two bottles of Mag Citrate for my last colonoscopy five years ago, I was cleaned out they didn't have any issues.\"  I advised the patient that we do not do the Mag Citrate prep due to Dr. Donohue not recommending it since patients weren't cleaned out completley. Patient would like a call back with other options for her prep, please advise.   "

## 2024-01-04 RX ORDER — ONDANSETRON 4 MG/1
4 TABLET, FILM COATED ORAL EVERY 8 HOURS PRN
Qty: 30 TABLET | Refills: 0 | Status: SHIPPED | OUTPATIENT
Start: 2024-01-04

## 2024-01-04 NOTE — TELEPHONE ENCOUNTER
Prep instructions reviewed. Pt aware to make prep very cold, sip slowly though a straw, she can take gasx for cramping and zofran for nausea. Pt states she is out of zofran and is requesting a refill for the procedure. Please advise.

## 2024-01-05 NOTE — PRE-PROCEDURE INSTRUCTIONS
"Instructed on date and arrival time of 0900. Come to entrance \"C\". Must have  over age 18 to drive home.  May have two visitors; however, children under 12 must stay in waiting room.  Discussed clear liquid diet (no red or purple), bowel prep, and NPO.  May take medications as usual except for blood thinners, diabetic medications, and weight loss medications.  Bring list of medications.  Verbalized understanding of instructions given.  Instructed to call for questions or concerns.  Hold Mounjaro for one week prior to procedure.  "

## 2024-01-10 ENCOUNTER — ANESTHESIA EVENT (OUTPATIENT)
Dept: GASTROENTEROLOGY | Facility: HOSPITAL | Age: 54
End: 2024-01-10
Payer: COMMERCIAL

## 2024-01-10 NOTE — ANESTHESIA PREPROCEDURE EVALUATION
Anesthesia Evaluation     Patient summary reviewed and Nursing notes reviewed   history of anesthetic complications:  PONV  NPO Solid Status: > 8 hours  NPO Liquid Status: > 4 hours           Airway   Mallampati: III  TM distance: >3 FB  Neck ROM: full  Large neck circumference and Possible difficult intubation  Dental      Comment: Bridge upper teeth    Pulmonary     breath sounds clear to auscultation  Cardiovascular   Exercise tolerance: good (4-7 METS)    Rhythm: regular  Rate: normal    (+) hypertension well controlled, hyperlipidemia      Neuro/Psych  (+) psychiatric history  GI/Hepatic/Renal/Endo    (+) morbid obesity, GERD well controlled, diabetes mellitus type 2    Musculoskeletal     Abdominal   (+) obese    Abdomen: soft.   Substance History      OB/GYN    (+) Pregnant, pregnancy induced hypertension        Other   arthritis,     ROS/Med Hx Other: Last dose Mounjaro 1/02 per phone conversation with patient     EKG 10/24/23: HR 77,   Sinus rhythm  Low voltage, precordial leads  Borderline T abnormalities, inferior leads                    Anesthesia Plan    ASA 4     general   total IV anesthesia  (Total IV Anesthesia    Patient understands anesthesia not responsible for dental damage.  )  intravenous induction     Anesthetic plan, risks, benefits, and alternatives have been provided, discussed and informed consent has been obtained with: patient and spouse/significant other.  Pre-procedure education provided  Plan discussed with CRNA.        CODE STATUS:

## 2024-01-11 ENCOUNTER — ANESTHESIA (OUTPATIENT)
Dept: GASTROENTEROLOGY | Facility: HOSPITAL | Age: 54
End: 2024-01-11
Payer: COMMERCIAL

## 2024-01-11 ENCOUNTER — HOSPITAL ENCOUNTER (OUTPATIENT)
Facility: HOSPITAL | Age: 54
Setting detail: HOSPITAL OUTPATIENT SURGERY
Discharge: HOME | End: 2024-01-11
Attending: INTERNAL MEDICINE | Admitting: INTERNAL MEDICINE
Payer: COMMERCIAL

## 2024-01-11 VITALS
HEART RATE: 70 BPM | OXYGEN SATURATION: 97 % | HEIGHT: 61 IN | RESPIRATION RATE: 18 BRPM | DIASTOLIC BLOOD PRESSURE: 67 MMHG | SYSTOLIC BLOOD PRESSURE: 99 MMHG | WEIGHT: 272.49 LBS | BODY MASS INDEX: 51.45 KG/M2 | TEMPERATURE: 98 F

## 2024-01-11 DIAGNOSIS — Z12.11 COLON CANCER SCREENING: ICD-10-CM

## 2024-01-11 LAB — GLUCOSE BLDC GLUCOMTR-MCNC: 93 MG/DL (ref 70–99)

## 2024-01-11 PROCEDURE — 88305 TISSUE EXAM BY PATHOLOGIST: CPT | Performed by: INTERNAL MEDICINE

## 2024-01-11 PROCEDURE — 25010000002 PROPOFOL 10 MG/ML EMULSION: Performed by: NURSE ANESTHETIST, CERTIFIED REGISTERED

## 2024-01-11 PROCEDURE — 82948 REAGENT STRIP/BLOOD GLUCOSE: CPT

## 2024-01-11 PROCEDURE — 25810000003 LACTATED RINGERS PER 1000 ML

## 2024-01-11 PROCEDURE — 45385 COLONOSCOPY W/LESION REMOVAL: CPT | Performed by: INTERNAL MEDICINE

## 2024-01-11 RX ORDER — PROPOFOL 10 MG/ML
VIAL (ML) INTRAVENOUS AS NEEDED
Status: DISCONTINUED | OUTPATIENT
Start: 2024-01-11 | End: 2024-01-11 | Stop reason: SURG

## 2024-01-11 RX ORDER — SODIUM CHLORIDE, SODIUM LACTATE, POTASSIUM CHLORIDE, CALCIUM CHLORIDE 600; 310; 30; 20 MG/100ML; MG/100ML; MG/100ML; MG/100ML
30 INJECTION, SOLUTION INTRAVENOUS CONTINUOUS
Status: DISCONTINUED | OUTPATIENT
Start: 2024-01-11 | End: 2024-01-11 | Stop reason: HOSPADM

## 2024-01-11 RX ORDER — LIDOCAINE HYDROCHLORIDE 20 MG/ML
INJECTION, SOLUTION EPIDURAL; INFILTRATION; INTRACAUDAL; PERINEURAL AS NEEDED
Status: DISCONTINUED | OUTPATIENT
Start: 2024-01-11 | End: 2024-01-11 | Stop reason: SURG

## 2024-01-11 RX ORDER — HYDROCHLOROTHIAZIDE 12.5 MG/1
12.5 TABLET ORAL DAILY
COMMUNITY

## 2024-01-11 RX ADMIN — PROPOFOL 175 MCG/KG/MIN: 10 INJECTION, EMULSION INTRAVENOUS at 10:09

## 2024-01-11 RX ADMIN — LIDOCAINE HYDROCHLORIDE 100 MG: 20 INJECTION, SOLUTION EPIDURAL; INFILTRATION; INTRACAUDAL; PERINEURAL at 10:09

## 2024-01-11 RX ADMIN — PROPOFOL 50 MG: 10 INJECTION, EMULSION INTRAVENOUS at 10:09

## 2024-01-11 RX ADMIN — SODIUM CHLORIDE, POTASSIUM CHLORIDE, SODIUM LACTATE AND CALCIUM CHLORIDE 30 ML/HR: 600; 310; 30; 20 INJECTION, SOLUTION INTRAVENOUS at 09:32

## 2024-01-11 NOTE — ANESTHESIA POSTPROCEDURE EVALUATION
Patient: Serenity Fleming    Procedure Summary       Date: 01/11/24 Room / Location: [facility] ENDOSCOPY 3 / [facility] END[facility]    Anesthesia Start: 1008 Anesthesia Stop: 1034    Procedure: COLONOSCOPY WITH COLD SNARE POLYPECTOMIES Diagnosis:       Colon cancer screening      (Colon cancer screening [Z12.11])    Surgeons: Steph Donohue MD Provider: Jen Glover CRNA    Anesthesia Type: general ASA Status: 4            Anesthesia Type: general    Vitals  Vitals Value Taken Time   BP 99/67 01/11/24 1053   Temp 36.7 °C (98 °F) 01/11/24 1052   Pulse 69 01/11/24 1054   Resp 18 01/11/24 1052   SpO2 94 % 01/11/24 1054   Vitals shown include unfiled device data.        Post Anesthesia Care and Evaluation    Post-procedure mental status: acceptable.  Pain management: satisfactory to patient    Airway patency: patent  Anesthetic complications: No anesthetic complications    Cardiovascular status: acceptable  Respiratory status: acceptable    Comments: Per chart review

## 2024-01-11 NOTE — H&P
Pre Procedure History & Physical    Chief Complaint:   Screening colonoscopy    Subjective     HPI:   54 yo F here for screening colonoscopy.    Past Medical History:   Past Medical History:   Diagnosis Date    Acute medial meniscus tear     LEFT    Allergic     Allergic to Codeine    Anemia     NO CURRENT ISSUES    B12 deficiency 2022    Claustrophobia     Colon polyps     Essential hypertension     GERD (gastroesophageal reflux disease)     Gestational hypertension 2001    Glaucoma     Hyperlipidemia 2022    Hypertension     PCOS (polycystic ovarian syndrome) 2022    Polycystic ovary syndrome     PONV (postoperative nausea and vomiting)     Type 2 diabetes mellitus without complication, without long-term current use of insulin 2022    NO OTHER MEDS, DIET AND EXERCISE TO MANAGE    Varicella 1974    Vitamin D deficiency 2022       Past Surgical History:  Past Surgical History:   Procedure Laterality Date    ADENOIDECTOMY       SECTION      CHOLECYSTECTOMY      COLONOSCOPY      DILATATION AND CURETTAGE      ENDOMETRIAL ABLATION      ENDOSCOPY      KNEE ARTHROSCOPY W/ MENISCECTOMY Left 10/24/2023    Procedure: KNEE ARTHROSCOPY WITH PARTIAL MEDIAL MENISCECTOMY, CHONDROPLASTY;  Surgeon: Simon Hwang MD;  [place] [facility] [place] [facility] [place]  Service: [place]opedics;  Laterality: Left;    KNEE CARTILAGE SURGERY Right     RIGHT MENISCUS SURGERY    LAPAROSCOPIC CHOLECYSTECTOMY      ROTATOR CUFF REPAIR Right     TONSILLECTOMY      UPPER GASTROINTESTINAL ENDOSCOPY         Family History:  Family History   Problem Relation Age of Onset    Hypertension Mother     Diabetes Mother     Kidney disease Mother     Cancer Father     Lymphoma Father         malignant     Hypertension Sister     Hypertension Brother     No Known Problems Daughter     No Known Problems Son     Hypertension Maternal Aunt     Stomach cancer Paternal Aunt         malignant    No Known Problems Maternal Grandmother  "    Hypertension Maternal Grandfather     No Known Problems Paternal Grandmother     Breast cancer Other 50    BRCA 1/2 Neg Hx     Colon cancer Neg Hx     Endometrial cancer Neg Hx     Ovarian cancer Neg Hx     Malig Hyperthermia Neg Hx        Social History:   reports that she has never smoked. She has never been exposed to tobacco smoke. She has never used smokeless tobacco. She reports that she does not drink alcohol and does not use drugs.    Medications:   Medications Prior to Admission   Medication Sig Dispense Refill Last Dose    Cholecalciferol (vitamin D3) 125 MCG (5000 UT) capsule capsule Take 4,000 Units by mouth Daily. LAST DOSES 10/20/23       hydroCHLOROthiazide (HYDRODIURIL) 12.5 MG tablet Take 1 tablet by mouth Daily.       losartan (COZAAR) 25 MG tablet TAKE 1 TABLET BY MOUTH EVERY DAY (Patient taking differently: Take 1 tablet by mouth Every Night. LAST DOSE 10/23/23) 90 tablet 0     Multiple Vitamins-Minerals (MULTIVITAMIN ADULT PO) Take 1 tablet by mouth Daily. LAST DOSE 10/20/23       nystatin (MYCOSTATIN) 425652 UNIT/GM cream Apply 1 application topically to the appropriate area as directed As Needed (skin yeast). 60 g 3     nystatin (MYCOSTATIN) 531984 UNIT/GM powder Apply  topically to the appropriate area as directed 2 (Two) Times a Day. 60 g 3     omeprazole (priLOSEC) 20 MG capsule Take 1 capsule by mouth Daily. 90 capsule 3     ondansetron (Zofran) 4 MG tablet Take 1 tablet by mouth Every 8 (Eight) Hours As Needed for Nausea or Vomiting. 30 tablet 0     Tirzepatide (MOUNJARO) 7.5 MG/0.5ML solution pen-injector pen Inject 0.5 mL under the skin into the appropriate area as directed 1 (One) Time Per Week. 6 mL 0        Allergies:  Codeine and Ciprofloxacin    ROS:    Pertinent items are noted in HPI     Objective     Blood pressure 129/67, temperature 97.8 °F (36.6 °C), temperature source Tympanic, resp. rate 17, height 154.9 cm (60.98\"), weight 124 kg (272 lb 7.8 oz), not currently " breastfeeding.    Physical Exam   Constitutional: Pt is oriented to person, place, and time and well-developed, well-nourished, and in no distress.   Mouth/Throat: Oropharynx is clear and moist.   Neck: Normal range of motion.   Cardiovascular: Normal rate, regular rhythm and normal heart sounds.    Pulmonary/Chest: Effort normal and breath sounds normal.   Abdominal: Soft. Nontender  Skin: Skin is warm and dry.   Psychiatric: Mood, memory, affect and judgment normal.     Assessment & Plan     Diagnosis:  Screening colonoscopy    Anticipated Surgical Procedure:  Colonoscopy    The risks, benefits, and alternatives of this procedure have been discussed with the patient or the responsible party- the patient understands and agrees to proceed.            41 yo male with sickle cell disease, recent admission for sepsis, presents with anemia 4.7 at Dr Castillo office, and fatigue. 43 yo male with sickle cell disease, recent admission for sepsis, presents with anemia 4.7 at Dr Castillo office, and fatigue.

## 2024-01-15 ENCOUNTER — TELEPHONE (OUTPATIENT)
Dept: GASTROENTEROLOGY | Facility: CLINIC | Age: 54
End: 2024-01-15
Payer: COMMERCIAL

## 2024-01-15 LAB
CYTO UR: NORMAL
LAB AP CASE REPORT: NORMAL
LAB AP CLINICAL INFORMATION: NORMAL
PATH REPORT.FINAL DX SPEC: NORMAL
PATH REPORT.GROSS SPEC: NORMAL

## 2024-01-15 NOTE — TELEPHONE ENCOUNTER
----- Message from JAMESON Mario sent at 1/15/2024 12:17 PM EST -----  Biopsies are benign.  Please place in recall for repeat colonoscopy in 3 years.  Send letter to patient and PCP.

## 2024-01-17 ENCOUNTER — LAB (OUTPATIENT)
Dept: LAB | Facility: HOSPITAL | Age: 54
End: 2024-01-17
Payer: COMMERCIAL

## 2024-01-17 DIAGNOSIS — E11.9 TYPE 2 DIABETES MELLITUS WITHOUT COMPLICATION, WITHOUT LONG-TERM CURRENT USE OF INSULIN: ICD-10-CM

## 2024-01-17 DIAGNOSIS — Z00.00 ANNUAL PHYSICAL EXAM: ICD-10-CM

## 2024-01-17 DIAGNOSIS — E78.2 MIXED HYPERLIPIDEMIA: ICD-10-CM

## 2024-01-17 LAB
ALBUMIN SERPL-MCNC: 4.4 G/DL (ref 3.5–5.2)
ALBUMIN/GLOB SERPL: 1.4 G/DL
ALP SERPL-CCNC: 89 U/L (ref 39–117)
ALT SERPL W P-5'-P-CCNC: 25 U/L (ref 1–33)
ANION GAP SERPL CALCULATED.3IONS-SCNC: 13.9 MMOL/L (ref 5–15)
AST SERPL-CCNC: 34 U/L (ref 1–32)
BASOPHILS # BLD AUTO: 0.05 10*3/MM3 (ref 0–0.2)
BASOPHILS NFR BLD AUTO: 0.7 % (ref 0–1.5)
BILIRUB SERPL-MCNC: 0.4 MG/DL (ref 0–1.2)
BUN SERPL-MCNC: 9 MG/DL (ref 6–20)
BUN/CREAT SERPL: 10 (ref 7–25)
CALCIUM SPEC-SCNC: 9.6 MG/DL (ref 8.6–10.5)
CHLORIDE SERPL-SCNC: 98 MMOL/L (ref 98–107)
CHOLEST SERPL-MCNC: 194 MG/DL (ref 0–200)
CO2 SERPL-SCNC: 27.1 MMOL/L (ref 22–29)
CREAT SERPL-MCNC: 0.9 MG/DL (ref 0.57–1)
DEPRECATED RDW RBC AUTO: 41.2 FL (ref 37–54)
EGFRCR SERPLBLD CKD-EPI 2021: 75.7 ML/MIN/1.73
EOSINOPHIL # BLD AUTO: 0.21 10*3/MM3 (ref 0–0.4)
EOSINOPHIL NFR BLD AUTO: 3 % (ref 0.3–6.2)
ERYTHROCYTE [DISTWIDTH] IN BLOOD BY AUTOMATED COUNT: 13.2 % (ref 12.3–15.4)
GLOBULIN UR ELPH-MCNC: 3.2 GM/DL
GLUCOSE SERPL-MCNC: 102 MG/DL (ref 65–99)
HBA1C MFR BLD: 6 % (ref 4.8–5.6)
HCT VFR BLD AUTO: 41.4 % (ref 34–46.6)
HDLC SERPL-MCNC: 50 MG/DL (ref 40–60)
HGB BLD-MCNC: 14 G/DL (ref 12–15.9)
IMM GRANULOCYTES # BLD AUTO: 0.03 10*3/MM3 (ref 0–0.05)
IMM GRANULOCYTES NFR BLD AUTO: 0.4 % (ref 0–0.5)
LDLC SERPL CALC-MCNC: 125 MG/DL (ref 0–100)
LDLC/HDLC SERPL: 2.45 {RATIO}
LYMPHOCYTES # BLD AUTO: 2.11 10*3/MM3 (ref 0.7–3.1)
LYMPHOCYTES NFR BLD AUTO: 30.3 % (ref 19.6–45.3)
MCH RBC QN AUTO: 29 PG (ref 26.6–33)
MCHC RBC AUTO-ENTMCNC: 33.8 G/DL (ref 31.5–35.7)
MCV RBC AUTO: 85.9 FL (ref 79–97)
MONOCYTES # BLD AUTO: 0.57 10*3/MM3 (ref 0.1–0.9)
MONOCYTES NFR BLD AUTO: 8.2 % (ref 5–12)
NEUTROPHILS NFR BLD AUTO: 4 10*3/MM3 (ref 1.7–7)
NEUTROPHILS NFR BLD AUTO: 57.4 % (ref 42.7–76)
NRBC BLD AUTO-RTO: 0 /100 WBC (ref 0–0.2)
PLATELET # BLD AUTO: 322 10*3/MM3 (ref 140–450)
PMV BLD AUTO: 10.7 FL (ref 6–12)
POTASSIUM SERPL-SCNC: 3.6 MMOL/L (ref 3.5–5.2)
PROT SERPL-MCNC: 7.6 G/DL (ref 6–8.5)
RBC # BLD AUTO: 4.82 10*6/MM3 (ref 3.77–5.28)
SODIUM SERPL-SCNC: 139 MMOL/L (ref 136–145)
TRIGL SERPL-MCNC: 108 MG/DL (ref 0–150)
TSH SERPL DL<=0.05 MIU/L-ACNC: 1.67 UIU/ML (ref 0.27–4.2)
VLDLC SERPL-MCNC: 19 MG/DL (ref 5–40)
WBC NRBC COR # BLD AUTO: 6.97 10*3/MM3 (ref 3.4–10.8)

## 2024-01-17 PROCEDURE — 36415 COLL VENOUS BLD VENIPUNCTURE: CPT

## 2024-01-17 PROCEDURE — 80050 GENERAL HEALTH PANEL: CPT

## 2024-01-17 PROCEDURE — 80061 LIPID PANEL: CPT

## 2024-01-17 PROCEDURE — 83036 HEMOGLOBIN GLYCOSYLATED A1C: CPT

## 2024-01-23 ENCOUNTER — OFFICE VISIT (OUTPATIENT)
Dept: FAMILY MEDICINE CLINIC | Facility: CLINIC | Age: 54
End: 2024-01-23
Payer: COMMERCIAL

## 2024-01-23 VITALS
OXYGEN SATURATION: 97 % | HEART RATE: 79 BPM | WEIGHT: 274.6 LBS | BODY MASS INDEX: 51.85 KG/M2 | DIASTOLIC BLOOD PRESSURE: 63 MMHG | RESPIRATION RATE: 18 BRPM | SYSTOLIC BLOOD PRESSURE: 104 MMHG | HEIGHT: 61 IN | TEMPERATURE: 97.8 F

## 2024-01-23 DIAGNOSIS — E11.9 TYPE 2 DIABETES MELLITUS WITHOUT COMPLICATION, WITHOUT LONG-TERM CURRENT USE OF INSULIN: Primary | ICD-10-CM

## 2024-01-23 DIAGNOSIS — E55.9 VITAMIN D DEFICIENCY: Chronic | ICD-10-CM

## 2024-01-23 DIAGNOSIS — I10 ESSENTIAL HYPERTENSION: ICD-10-CM

## 2024-01-23 DIAGNOSIS — E66.01 CLASS 3 SEVERE OBESITY DUE TO EXCESS CALORIES WITH SERIOUS COMORBIDITY AND BODY MASS INDEX (BMI) OF 50.0 TO 59.9 IN ADULT: ICD-10-CM

## 2024-01-23 DIAGNOSIS — R74.01 ELEVATED ALT MEASUREMENT: ICD-10-CM

## 2024-01-23 PROBLEM — E66.813 CLASS 3 SEVERE OBESITY DUE TO EXCESS CALORIES WITH SERIOUS COMORBIDITY AND BODY MASS INDEX (BMI) OF 50.0 TO 59.9 IN ADULT: Status: ACTIVE | Noted: 2022-03-10

## 2024-01-23 PROCEDURE — 99214 OFFICE O/P EST MOD 30 MIN: CPT | Performed by: FAMILY MEDICINE

## 2024-01-23 RX ORDER — BLOOD-GLUCOSE METER
KIT MISCELLANEOUS
Qty: 1 EACH | Refills: 0 | Status: SHIPPED | OUTPATIENT
Start: 2024-01-23

## 2024-01-23 RX ORDER — LANCETS 28 GAUGE
EACH MISCELLANEOUS
Qty: 100 EACH | Refills: 5 | Status: SHIPPED | OUTPATIENT
Start: 2024-01-23

## 2024-01-23 NOTE — PROGRESS NOTES
"Chief Complaint  Diabetes, Hypertension, and Hyperlipidemia    Subjective        Serenity Fleming presents to [facility]  History of Present Illness  She is here today for the management of her chronic medical conditions. She is a retired nurse. She has anemia, hypertension, B12 deficiency, GERD, hyperlipidemia, morbid obesity, type 2 diabetes and vitamin D deficiency.  She is  and has 1 son.     She has been taking Mounjaro 7-1/2 mg weekly and her blood sugar is improving. She has gained a couple pounds since her last visit.     She checks her blood pressure at home and says it runs in the mid 120s systolically.     The patient has no other complaints today and denies chest pain, shortness of breath, weakness, numbness, nausea, vomiting, diarrhea, dizziness or syncopal event.        Objective   Vital Signs:  /63 (BP Location: Left arm, Patient Position: Sitting, Cuff Size: Adult)   Pulse 79   Temp 97.8 °F (36.6 °C) (Tympanic)   Resp 18   Ht 154.9 cm (60.98\")   Wt 125 kg (274 lb 9.6 oz)   SpO2 97%   BMI 51.91 kg/m²   Estimated body mass index is 51.91 kg/m² as calculated from the following:    Height as of this encounter: 154.9 cm (60.98\").    Weight as of this encounter: 125 kg (274 lb 9.6 oz).             Physical Exam  Vitals reviewed.   Constitutional:       Appearance: She is well-developed. She is morbidly obese.   HENT:      Head: Normocephalic and atraumatic.      Right Ear: External ear normal.      Left Ear: External ear normal.      Mouth/Throat:      Pharynx: No oropharyngeal exudate.   Eyes:      Conjunctiva/sclera: Conjunctivae normal.      Pupils: Pupils are equal, round, and reactive to light.   Neck:      Vascular: No carotid bruit.   Cardiovascular:      Rate and Rhythm: Normal rate and regular rhythm.      Heart sounds: No murmur heard.     No friction rub. No gallop.   Pulmonary:      Effort: Pulmonary effort is normal.      Breath sounds: " Normal breath sounds. No wheezing or rhonchi.   Abdominal:      General: There is no distension.   Skin:     General: Skin is warm and dry.   Neurological:      Mental Status: She is alert and oriented to person, place, and time.      Cranial Nerves: No cranial nerve deficit.      Motor: No weakness.   Psychiatric:         Mood and Affect: Mood and affect normal.         Behavior: Behavior normal.         Thought Content: Thought content normal.         Judgment: Judgment normal.        Result Review :      CMP          9/18/2023    13:03 1/17/2024    10:38   CMP   Glucose 95  102    BUN 6  9    Creatinine 0.80  0.90    EGFR 87.1  75.7    Sodium 142  139    Potassium 3.8  3.6    Chloride 103  98    Calcium 9.6  9.6    Total Protein 7.8  7.6    Albumin 4.3  4.4    Globulin 3.5  3.2    Total Bilirubin 0.3  0.4    Alkaline Phosphatase 90  89    AST (SGOT) 33  34    ALT (SGPT) 36  25    Albumin/Globulin Ratio 1.2  1.4    BUN/Creatinine Ratio 7.5  10.0    Anion Gap 13.2  13.9      CBC          9/18/2023    13:03 1/17/2024    10:38   CBC   WBC 7.59  6.97    RBC 5.05  4.82    Hemoglobin 14.5  14.0    Hematocrit 43.2  41.4    MCV 85.5  85.9    MCH 28.7  29.0    MCHC 33.6  33.8    RDW 13.3  13.2    Platelets 308  322      Lipid Panel          9/18/2023    13:03 1/17/2024    10:38   Lipid Panel   Total Cholesterol 169  194    Triglycerides 81  108    HDL Cholesterol 52  50    VLDL Cholesterol 15  19    LDL Cholesterol  102  125    LDL/HDL Ratio 1.94  2.45      TSH          9/18/2023    13:03 1/17/2024    10:38   TSH   TSH 1.300  1.670                   Assessment and Plan     Diagnoses and all orders for this visit:    1. Type 2 diabetes mellitus without complication, without long-term current use of insulin (Primary)  Assessment & Plan:  Diabetes is improving with treatment.   Continue current treatment regimen.  Dietary recommendations for ADA diet.  Diabetes will be reassessed in 6 months.    Orders:  -     glucose monitor  monitoring kit; Use bid to check blood sugar  Dispense: 1 each; Refill: 0  -     glucose blood test strip; Use bid to check blood sugar  Dispense: 100 each; Refill: 5  -     Lancets (freestyle) lancets; Use bid to check blood sugar  Dispense: 100 each; Refill: 5  -     Hemoglobin A1c; Future    2. Essential hypertension  Assessment & Plan:  Hypertension is improving with treatment.  Continue current treatment regimen.  Dietary sodium restriction.  Weight loss.  Blood pressure will be reassessed at the next regular appointment.      3. Elevated ALT measurement  -     Comprehensive metabolic panel; Future    4. Vitamin D deficiency  -     Vitamin D 25 hydroxy; Future    5. Class 3 severe obesity due to excess calories with serious comorbidity and body mass index (BMI) of 50.0 to 59.9 in adult  Assessment & Plan:  Patient's (Body mass index is 51.91 kg/m².) indicates that they are morbidly/severely obese (BMI > 40 or > 35 with obesity - related health condition) with health conditions that include hypertension, diabetes mellitus, and dyslipidemias . Weight is worsening. BMI  is above average; BMI management plan is completed. We discussed low calorie, low carb based diet program, portion control, and increasing exercise.                Follow Up     Return in about 6 months (around 7/23/2024).  Patient was given instructions and counseling regarding her condition or for health maintenance advice. Please see specific information pulled into the AVS if appropriate.         Answers submitted by the patient for this visit:  Other (Submitted on 1/17/2024)  Please describe your symptoms.: Follow up appointment  Have you had these symptoms before?: No  How long have you been having these symptoms?: 1-4 days  Primary Reason for Visit (Submitted on 1/17/2024)  What is the primary reason for your visit?: Other

## 2024-01-24 ENCOUNTER — PATIENT ROUNDING (BHMG ONLY) (OUTPATIENT)
Dept: FAMILY MEDICINE CLINIC | Facility: CLINIC | Age: 54
End: 2024-01-24
Payer: COMMERCIAL

## 2024-01-24 NOTE — ASSESSMENT & PLAN NOTE
Patient's (Body mass index is 51.91 kg/m².) indicates that they are morbidly/severely obese (BMI > 40 or > 35 with obesity - related health condition) with health conditions that include hypertension, diabetes mellitus, and dyslipidemias . Weight is worsening. BMI  is above average; BMI management plan is completed. We discussed low calorie, low carb based diet program, portion control, and increasing exercise.

## 2024-02-16 RX ORDER — OMEPRAZOLE 20 MG/1
20 CAPSULE, DELAYED RELEASE ORAL DAILY
Qty: 90 CAPSULE | Refills: 3 | OUTPATIENT
Start: 2024-02-16

## 2024-02-16 RX ORDER — HYDROCHLOROTHIAZIDE 12.5 MG/1
12.5 TABLET ORAL DAILY
Qty: 90 TABLET | Refills: 3 | Status: SHIPPED | OUTPATIENT
Start: 2024-02-16

## 2024-02-16 RX ORDER — HYDROCHLOROTHIAZIDE 12.5 MG/1
CAPSULE, GELATIN COATED ORAL
Qty: 90 CAPSULE | Refills: 1 | OUTPATIENT
Start: 2024-02-16

## 2024-02-16 RX ORDER — OMEPRAZOLE 20 MG/1
20 CAPSULE, DELAYED RELEASE ORAL DAILY
Qty: 90 CAPSULE | Refills: 3 | Status: SHIPPED | OUTPATIENT
Start: 2024-02-16

## 2024-03-05 DIAGNOSIS — I10 ESSENTIAL HYPERTENSION: Chronic | ICD-10-CM

## 2024-03-06 RX ORDER — LOSARTAN POTASSIUM 25 MG/1
25 TABLET ORAL DAILY
Qty: 90 TABLET | Refills: 0 | Status: SHIPPED | OUTPATIENT
Start: 2024-03-06

## 2024-03-08 ENCOUNTER — TELEPHONE (OUTPATIENT)
Dept: OBSTETRICS AND GYNECOLOGY | Age: 54
End: 2024-03-08
Payer: COMMERCIAL

## 2024-03-08 RX ORDER — NYSTATIN 100000 [USP'U]/G
POWDER TOPICAL 2 TIMES DAILY
Qty: 60 G | Refills: 3 | Status: SHIPPED | OUTPATIENT
Start: 2024-03-08

## 2024-03-08 NOTE — TELEPHONE ENCOUNTER
Pt requesting refill on nystatin powder. Pt last seen for AE on 12/14/23. Pharmacy verified & on file. Please advise.

## 2024-04-05 NOTE — TELEPHONE ENCOUNTER
Caller: Bernadette Serenity L    Relationship: Self    Best call back number: [phone]    Requested Prescriptions:   Requested Prescriptions     Pending Prescriptions Disp Refills    Tirzepatide (MOUNJARO) 7.5 MG/0.5ML solution pen-injector pen 6 mL 0     Sig: Inject 0.5 mL under the skin into the appropriate area as directed 1 (One) Time Per Week.        Pharmacy where request should be sent: [facility]/PHARMACY #[id number] - [place], [place] - [address] [facility] [phone] [facility] [phone] FX     Last office visit with prescribing clinician: 1/23/2024   Last telemedicine visit with prescribing clinician: Visit date not found   Next office visit with prescribing clinician: 7/23/2024     Additional details provided by patient: PRESCRIPTION NEEDS TO BE FOR 90 DAY SUPPLY FOR INSURANCE PURPOSES   PATIENT IS READY FOR AN INCREASE IN DOSAGE   PLEASE CONTACT IF THERE IS ANY ISSUE    Does the patient have less than a 3 day supply:  [] Yes  [x] No    Ryan Do Rep   04/05/24 11:51 EDT

## 2024-04-08 ENCOUNTER — TELEPHONE (OUTPATIENT)
Dept: FAMILY MEDICINE CLINIC | Facility: CLINIC | Age: 54
End: 2024-04-08

## 2024-04-17 ENCOUNTER — TELEPHONE (OUTPATIENT)
Dept: FAMILY MEDICINE CLINIC | Facility: CLINIC | Age: 54
End: 2024-04-17
Payer: COMMERCIAL

## 2024-04-17 NOTE — TELEPHONE ENCOUNTER
Pt states [facility] does not have mounjaro 10mg pen, saying it is on backorder. She states she was on ozempic and had the same issue. I explained to the patient that we advise to call around to other pharmacies to see who has it in stock but she [facility].

## 2024-04-17 NOTE — TELEPHONE ENCOUNTER
Caller: Serenity Fleming    Relationship to patient: Self    Best call back number: [phone]    Patient is needing: PATIENT CALLED IN AND IS HAVING TROUBLE GETTING     Tirzepatide (MOUNJARO) 10 MG/0.5ML solution pen-injector pen   AND CAN ONLY USE [facility]. PATIENT IS REQUESTING GUIDANCE ON NEXT BEST STEPS PLEASE.

## 2024-04-19 NOTE — TELEPHONE ENCOUNTER
I advised her to call them back and see which doses I do have.  Some patients are having to step down and step up later.  This is not ideal but it does still help.

## 2024-06-01 DIAGNOSIS — I10 ESSENTIAL HYPERTENSION: Chronic | ICD-10-CM

## 2024-06-03 RX ORDER — LOSARTAN POTASSIUM 25 MG/1
25 TABLET ORAL DAILY
Qty: 90 TABLET | Refills: 0 | Status: SHIPPED | OUTPATIENT
Start: 2024-06-03

## 2024-06-28 ENCOUNTER — TELEPHONE (OUTPATIENT)
Dept: FAMILY MEDICINE CLINIC | Facility: CLINIC | Age: 54
End: 2024-06-28
Payer: COMMERCIAL

## 2024-06-28 NOTE — TELEPHONE ENCOUNTER
Caller: Serenity Fleming MIRIAM    Relationship: Self    Best call back number: [phone]     What form or medical record are you requesting: JURY DUTY EXCUSE    Who is requesting this form or medical record from you: COURT    How would you like to receive the form or medical records (pick-up, mail, fax):     Timeframe paperwork needed: BEFORE JULY 12, 2024    Additional notes: CAN DR. BOURGEOIS PROVIDE A LETTER TO THE COURTS EXCUSING PATIENT FROM JURY DUTY DUE TO STOMACH ISSUES OR AN EXCEPTION FOR ACCOMODATIONS TO GO TO THE RESTROOM ANYTIME SHE HAS TO.         MYCHART YES OR CALL MAY LEAVE VOICEMAIL.

## 2024-06-28 NOTE — TELEPHONE ENCOUNTER
Patient is wondering if she can be excused for diarrhea or she is wanting an exemption to be able to take multiple bathroom breaks when needed.

## 2024-07-01 ENCOUNTER — TELEPHONE (OUTPATIENT)
Dept: FAMILY MEDICINE CLINIC | Facility: CLINIC | Age: 54
End: 2024-07-01
Payer: COMMERCIAL

## 2024-07-01 NOTE — TELEPHONE ENCOUNTER
Caller: Serenity Fleming    Relationship: Self    Best call back number: [phone]     What medication are you requesting: MOUNJARO NEXT DOSE UP       If a prescription is needed, what is your preferred pharmacy and phone number:  [facility]/pharmacy #[id number] - [place], [place] [address] [facility] [phone] [facility] [phone]      Additional notes:PATIENT HAS 1 DOSE OF THE MOUNJARO 10 MG BUT NEEDS A 90 DAY SUPPLY OF THE NEXT DOSE UP SENT TO THE PHARMACY LISTED ABOVE.     PATIENT ASKS FOR A CALL ONCE SENT IN

## 2024-07-02 NOTE — TELEPHONE ENCOUNTER
Left message for patient that letter was placed in her my chart account.  OK for HUB to relay message if patient calls back.

## 2024-07-08 RX ORDER — ONDANSETRON 4 MG/1
4 TABLET, FILM COATED ORAL EVERY 8 HOURS PRN
Qty: 30 TABLET | Refills: 0 | Status: SHIPPED | OUTPATIENT
Start: 2024-07-08

## 2024-07-08 RX ORDER — TIRZEPATIDE 10 MG/.5ML
INJECTION, SOLUTION SUBCUTANEOUS
OUTPATIENT
Start: 2024-07-08

## 2024-07-18 ENCOUNTER — LAB (OUTPATIENT)
Dept: LAB | Facility: HOSPITAL | Age: 54
End: 2024-07-18
Payer: COMMERCIAL

## 2024-07-18 DIAGNOSIS — E55.9 VITAMIN D DEFICIENCY: Chronic | ICD-10-CM

## 2024-07-18 DIAGNOSIS — E11.9 TYPE 2 DIABETES MELLITUS WITHOUT COMPLICATION, WITHOUT LONG-TERM CURRENT USE OF INSULIN: ICD-10-CM

## 2024-07-18 DIAGNOSIS — R74.01 ELEVATED ALT MEASUREMENT: ICD-10-CM

## 2024-07-18 LAB
25(OH)D3 SERPL-MCNC: 16.3 NG/ML (ref 30–100)
ALBUMIN SERPL-MCNC: 4.3 G/DL (ref 3.5–5.2)
ALBUMIN/GLOB SERPL: 1.1 G/DL
ALP SERPL-CCNC: 100 U/L (ref 39–117)
ALT SERPL W P-5'-P-CCNC: 25 U/L (ref 1–33)
ANION GAP SERPL CALCULATED.3IONS-SCNC: 12.4 MMOL/L (ref 5–15)
AST SERPL-CCNC: 30 U/L (ref 1–32)
BILIRUB SERPL-MCNC: 0.5 MG/DL (ref 0–1.2)
BUN SERPL-MCNC: 10 MG/DL (ref 6–20)
BUN/CREAT SERPL: 11.2 (ref 7–25)
CALCIUM SPEC-SCNC: 9.7 MG/DL (ref 8.6–10.5)
CHLORIDE SERPL-SCNC: 100 MMOL/L (ref 98–107)
CO2 SERPL-SCNC: 25.6 MMOL/L (ref 22–29)
CREAT SERPL-MCNC: 0.89 MG/DL (ref 0.57–1)
EGFRCR SERPLBLD CKD-EPI 2021: 76.2 ML/MIN/1.73
GLOBULIN UR ELPH-MCNC: 3.8 GM/DL
GLUCOSE SERPL-MCNC: 88 MG/DL (ref 65–99)
HBA1C MFR BLD: 6.1 % (ref 4.8–5.6)
POTASSIUM SERPL-SCNC: 4 MMOL/L (ref 3.5–5.2)
PROT SERPL-MCNC: 8.1 G/DL (ref 6–8.5)
SODIUM SERPL-SCNC: 138 MMOL/L (ref 136–145)

## 2024-07-18 PROCEDURE — 82306 VITAMIN D 25 HYDROXY: CPT

## 2024-07-18 PROCEDURE — 83036 HEMOGLOBIN GLYCOSYLATED A1C: CPT

## 2024-07-18 PROCEDURE — 36415 COLL VENOUS BLD VENIPUNCTURE: CPT

## 2024-07-18 PROCEDURE — 80053 COMPREHEN METABOLIC PANEL: CPT

## 2024-07-21 DIAGNOSIS — E55.9 VITAMIN D DEFICIENCY: Primary | ICD-10-CM

## 2024-07-21 RX ORDER — ERGOCALCIFEROL 1.25 MG/1
50000 CAPSULE ORAL WEEKLY
Qty: 12 CAPSULE | Refills: 1 | Status: SHIPPED | OUTPATIENT
Start: 2024-07-21

## 2024-07-23 ENCOUNTER — OFFICE VISIT (OUTPATIENT)
Dept: FAMILY MEDICINE CLINIC | Facility: CLINIC | Age: 54
End: 2024-07-23
Payer: COMMERCIAL

## 2024-07-23 VITALS
BODY MASS INDEX: 50.34 KG/M2 | WEIGHT: 266.6 LBS | HEART RATE: 69 BPM | HEIGHT: 61 IN | RESPIRATION RATE: 18 BRPM | DIASTOLIC BLOOD PRESSURE: 73 MMHG | TEMPERATURE: 97.3 F | SYSTOLIC BLOOD PRESSURE: 122 MMHG | OXYGEN SATURATION: 98 %

## 2024-07-23 DIAGNOSIS — E55.9 VITAMIN D DEFICIENCY: Chronic | ICD-10-CM

## 2024-07-23 DIAGNOSIS — Z00.00 ANNUAL PHYSICAL EXAM: ICD-10-CM

## 2024-07-23 DIAGNOSIS — E11.9 TYPE 2 DIABETES MELLITUS WITHOUT COMPLICATION, WITHOUT LONG-TERM CURRENT USE OF INSULIN: Primary | Chronic | ICD-10-CM

## 2024-07-23 DIAGNOSIS — E66.01 CLASS 3 SEVERE OBESITY DUE TO EXCESS CALORIES WITH SERIOUS COMORBIDITY AND BODY MASS INDEX (BMI) OF 50.0 TO 59.9 IN ADULT: Chronic | ICD-10-CM

## 2024-07-23 DIAGNOSIS — I10 ESSENTIAL HYPERTENSION: Chronic | ICD-10-CM

## 2024-07-23 PROBLEM — E66.813 CLASS 3 SEVERE OBESITY DUE TO EXCESS CALORIES WITH SERIOUS COMORBIDITY AND BODY MASS INDEX (BMI) OF 50.0 TO 59.9 IN ADULT: Chronic | Status: ACTIVE | Noted: 2022-03-10

## 2024-07-23 PROCEDURE — 99214 OFFICE O/P EST MOD 30 MIN: CPT | Performed by: FAMILY MEDICINE

## 2024-07-23 NOTE — ASSESSMENT & PLAN NOTE
Patient's (Body mass index is 50.41 kg/m².) indicates that they are morbidly/severely obese (BMI > 40 or > 35 with obesity - related health condition) with health conditions that include hypertension, diabetes mellitus, and dyslipidemias . Weight is improving with treatment. BMI  is above average; BMI management plan is completed. We discussed low calorie, low carb based diet program, portion control, and increasing exercise.

## 2024-07-23 NOTE — ASSESSMENT & PLAN NOTE
Diabetes is improving with treatment.   Continue current treatment regimen.  Recommended an ADA diet.  Diabetes will be reassessed in 6 months

## 2024-07-23 NOTE — PROGRESS NOTES
"Chief Complaint  Diabetes (6 month follow up on diabetes, last A1c was on 7/18/2024 at 6.10)    Subjective        Serenity Fleming presents to [facility]  History of Present Illness  She is here today for the management of her chronic medical conditions. She is a retired nurse. She has anemia, hypertension, B12 deficiency, GERD, hyperlipidemia, morbid obesity, type 2 diabetes and vitamin D deficiency.  She is  and has 1 son.     She has been taking Mounjaro 12-1/2 mg weekly and her blood sugar is improving. She has gained a couple pounds since her last visit.     She checks her blood pressure at home and says it runs in the mid 120s systolically.     The patient has no other complaints today and denies chest pain, shortness of breath, weakness, numbness, nausea, vomiting, diarrhea, dizziness or syncopal event.           Objective   Vital Signs:  /73   Pulse 69   Temp 97.3 °F (36.3 °C) (Temporal)   Resp 18   Ht 154.9 cm (60.98\")   Wt 121 kg (266 lb 9.6 oz)   SpO2 98%   BMI 50.41 kg/m²   Estimated body mass index is 50.41 kg/m² as calculated from the following:    Height as of this encounter: 154.9 cm (60.98\").    Weight as of this encounter: 121 kg (266 lb 9.6 oz).               Physical Exam  Vitals reviewed.   Constitutional:       Appearance: She is well-developed. She is morbidly obese.   HENT:      Head: Normocephalic and atraumatic.      Right Ear: External ear normal.      Left Ear: External ear normal.      Mouth/Throat:      Pharynx: No oropharyngeal exudate.   Eyes:      Conjunctiva/sclera: Conjunctivae normal.      Pupils: Pupils are equal, round, and reactive to light.   Neck:      Vascular: No carotid bruit.   Cardiovascular:      Rate and Rhythm: Normal rate and regular rhythm.      Heart sounds: No murmur heard.     No friction rub. No gallop.   Pulmonary:      Effort: Pulmonary effort is normal.      Breath sounds: Normal breath sounds. No wheezing " or rhonchi.   Abdominal:      General: There is no distension.   Skin:     General: Skin is warm and dry.   Neurological:      Mental Status: She is alert and oriented to person, place, and time.      Cranial Nerves: No cranial nerve deficit.      Motor: No weakness.   Psychiatric:         Mood and Affect: Mood and affect normal.         Behavior: Behavior normal.         Thought Content: Thought content normal.         Judgment: Judgment normal.        Result Review :      CMP          9/18/2023    13:03 1/17/2024    10:38 7/18/2024    12:08   CMP   Glucose 95  102  88    BUN 6  9  10    Creatinine 0.80  0.90  0.89    EGFR 87.1  75.7  76.2    Sodium 142  139  138    Potassium 3.8  3.6  4.0    Chloride 103  98  100    Calcium 9.6  9.6  9.7    Total Protein 7.8  7.6  8.1    Albumin 4.3  4.4  4.3    Globulin 3.5  3.2  3.8    Total Bilirubin 0.3  0.4  0.5    Alkaline Phosphatase 90  89  100    AST (SGOT) 33  34  30    ALT (SGPT) 36  25  25    Albumin/Globulin Ratio 1.2  1.4  1.1    BUN/Creatinine Ratio 7.5  10.0  11.2    Anion Gap 13.2  13.9  12.4      CBC          9/18/2023    13:03 1/17/2024    10:38   CBC   WBC 7.59  6.97    RBC 5.05  4.82    Hemoglobin 14.5  14.0    Hematocrit 43.2  41.4    MCV 85.5  85.9    MCH 28.7  29.0    MCHC 33.6  33.8    RDW 13.3  13.2    Platelets 308  322      Lipid Panel          9/18/2023    13:03 1/17/2024    10:38   Lipid Panel   Total Cholesterol 169  194    Triglycerides 81  108    HDL Cholesterol 52  50    VLDL Cholesterol 15  19    LDL Cholesterol  102  125    LDL/HDL Ratio 1.94  2.45      TSH          9/18/2023    13:03 1/17/2024    10:38   TSH   TSH 1.300  1.670                   Assessment and Plan     Diagnoses and all orders for this visit:    1. Type 2 diabetes mellitus without complication, without long-term current use of insulin (Primary)  Assessment & Plan:  Diabetes is improving with treatment.   Continue current treatment regimen.  Recommended an ADA diet.  Diabetes  will be reassessed in 6 months    Orders:  -     Microalbumin / Creatinine Urine Ratio - Urine, Clean Catch; Future    2. Class 3 severe obesity due to excess calories with serious comorbidity and body mass index (BMI) of 50.0 to 59.9 in adult  Assessment & Plan:  Patient's (Body mass index is 50.41 kg/m².) indicates that they are morbidly/severely obese (BMI > 40 or > 35 with obesity - related health condition) with health conditions that include hypertension, diabetes mellitus, and dyslipidemias . Weight is improving with treatment. BMI  is above average; BMI management plan is completed. We discussed low calorie, low carb based diet program, portion control, and increasing exercise.       3. Essential hypertension  Assessment & Plan:  Hypertension is stable and controlled  Continue current treatment regimen.  Dietary sodium restriction.  Weight loss.  Blood pressure will be reassessed in 6 months.      4. Vitamin D deficiency  -     Vitamin D 25 hydroxy; Standing    5. Annual physical exam  -     Urinalysis With Microscopic - Urine, Clean Catch; Future             Follow Up     Return in about 4 months (around 11/23/2024).  Patient was given instructions and counseling regarding her condition or for health maintenance advice. Please see specific information pulled into the AVS if appropriate.         Answers submitted by the patient for this visit:  Other (Submitted on 7/16/2024)  Please describe your symptoms.: Routine exam  Have you had these symptoms before?: No  How long have you been having these symptoms?: Greater than 2 weeks  Primary Reason for Visit (Submitted on 7/16/2024)  What is the primary reason for your visit?: Other

## 2024-07-24 ENCOUNTER — PATIENT ROUNDING (BHMG ONLY) (OUTPATIENT)
Dept: FAMILY MEDICINE CLINIC | Facility: CLINIC | Age: 54
End: 2024-07-24
Payer: COMMERCIAL

## 2024-09-01 DIAGNOSIS — I10 ESSENTIAL HYPERTENSION: Chronic | ICD-10-CM

## 2024-09-03 RX ORDER — LOSARTAN POTASSIUM 25 MG/1
25 TABLET ORAL DAILY
Qty: 90 TABLET | Refills: 0 | Status: SHIPPED | OUTPATIENT
Start: 2024-09-03

## 2024-09-30 RX ORDER — TIRZEPATIDE 12.5 MG/.5ML
INJECTION, SOLUTION SUBCUTANEOUS
Qty: 0.5 ML | Refills: 0 | Status: SHIPPED | OUTPATIENT
Start: 2024-09-30

## 2024-11-06 ENCOUNTER — TELEPHONE (OUTPATIENT)
Dept: FAMILY MEDICINE CLINIC | Facility: CLINIC | Age: 54
End: 2024-11-06
Payer: COMMERCIAL

## 2024-11-06 DIAGNOSIS — E78.2 MIXED HYPERLIPIDEMIA: Primary | Chronic | ICD-10-CM

## 2024-11-06 DIAGNOSIS — E11.9 TYPE 2 DIABETES MELLITUS WITHOUT COMPLICATION, WITHOUT LONG-TERM CURRENT USE OF INSULIN: Chronic | ICD-10-CM

## 2024-11-06 DIAGNOSIS — D64.9 ANEMIA, UNSPECIFIED TYPE: Chronic | ICD-10-CM

## 2024-11-06 NOTE — TELEPHONE ENCOUNTER
Caller: Serenity Fleming    Relationship: Self    Best call back number: [phone]    What orders are you requesting (i.e. lab or imaging): BLOOD LAB ORDERS    In what timeframe would the patient need to come in: THE WEEK OF THE 11/18/2024    Where will you receive your lab/imaging services: IN OFFICE    Additional notes: PATIENT WOULD LIKE TO ADD TO HER LAB ORDERS FOR HER UPCOMING APPT. SHE WOULD LIKE A CMP CBC A1C LIPID PANEL. PLEASE ADD THESE ORDERS SO SHE CAN COME IN A WEEK BEFORE HER APPT AND HAVE THESE DONE.

## 2024-11-06 NOTE — TELEPHONE ENCOUNTER
Patient asking for additional lab orders to complete the week of 11/18/24.  Pended what patient requested.

## 2024-11-08 NOTE — TELEPHONE ENCOUNTER
Left message for patient that labs were ordered.  I also spoke with her  and made him aware labs were ordered for her, he states he will let her know.

## 2024-11-20 ENCOUNTER — LAB (OUTPATIENT)
Dept: LAB | Facility: HOSPITAL | Age: 54
End: 2024-11-20
Payer: COMMERCIAL

## 2024-11-20 DIAGNOSIS — E11.9 TYPE 2 DIABETES MELLITUS WITHOUT COMPLICATION, WITHOUT LONG-TERM CURRENT USE OF INSULIN: Chronic | ICD-10-CM

## 2024-11-20 DIAGNOSIS — D64.9 ANEMIA, UNSPECIFIED TYPE: ICD-10-CM

## 2024-11-20 DIAGNOSIS — Z00.00 ANNUAL PHYSICAL EXAM: ICD-10-CM

## 2024-11-20 DIAGNOSIS — E55.9 VITAMIN D DEFICIENCY: Chronic | ICD-10-CM

## 2024-11-20 DIAGNOSIS — E78.2 MIXED HYPERLIPIDEMIA: Chronic | ICD-10-CM

## 2024-11-20 LAB
25(OH)D3 SERPL-MCNC: 49.7 NG/ML (ref 30–100)
ALBUMIN SERPL-MCNC: 3.9 G/DL (ref 3.5–5.2)
ALBUMIN UR-MCNC: 1.7 MG/DL
ALBUMIN/GLOB SERPL: 1.1 G/DL
ALP SERPL-CCNC: 112 U/L (ref 39–117)
ALT SERPL W P-5'-P-CCNC: 33 U/L (ref 1–33)
ANION GAP SERPL CALCULATED.3IONS-SCNC: 11.4 MMOL/L (ref 5–15)
AST SERPL-CCNC: 31 U/L (ref 1–32)
BACTERIA UR QL AUTO: ABNORMAL /HPF
BASOPHILS # BLD AUTO: 0.04 10*3/MM3 (ref 0–0.2)
BASOPHILS NFR BLD AUTO: 0.6 % (ref 0–1.5)
BILIRUB SERPL-MCNC: 0.4 MG/DL (ref 0–1.2)
BILIRUB UR QL STRIP: NEGATIVE
BUN SERPL-MCNC: 10 MG/DL (ref 6–20)
BUN/CREAT SERPL: 10.9 (ref 7–25)
CALCIUM SPEC-SCNC: 9.7 MG/DL (ref 8.6–10.5)
CHLORIDE SERPL-SCNC: 99 MMOL/L (ref 98–107)
CHOLEST SERPL-MCNC: 178 MG/DL (ref 0–200)
CLARITY UR: CLEAR
CO2 SERPL-SCNC: 26.6 MMOL/L (ref 22–29)
COLOR UR: YELLOW
CREAT SERPL-MCNC: 0.92 MG/DL (ref 0.57–1)
CREAT UR-MCNC: 185.4 MG/DL
DEPRECATED RDW RBC AUTO: 39.9 FL (ref 37–54)
EGFRCR SERPLBLD CKD-EPI 2021: 73.2 ML/MIN/1.73
EOSINOPHIL # BLD AUTO: 0.18 10*3/MM3 (ref 0–0.4)
EOSINOPHIL NFR BLD AUTO: 2.6 % (ref 0.3–6.2)
ERYTHROCYTE [DISTWIDTH] IN BLOOD BY AUTOMATED COUNT: 12.9 % (ref 12.3–15.4)
GLOBULIN UR ELPH-MCNC: 3.7 GM/DL
GLUCOSE SERPL-MCNC: 98 MG/DL (ref 65–99)
GLUCOSE UR STRIP-MCNC: NEGATIVE MG/DL
HBA1C MFR BLD: 5.9 % (ref 4.8–5.6)
HCT VFR BLD AUTO: 40.3 % (ref 34–46.6)
HDLC SERPL-MCNC: 42 MG/DL (ref 40–60)
HGB BLD-MCNC: 13.8 G/DL (ref 12–15.9)
HGB UR QL STRIP.AUTO: NEGATIVE
HYALINE CASTS UR QL AUTO: ABNORMAL /LPF
IMM GRANULOCYTES # BLD AUTO: 0.01 10*3/MM3 (ref 0–0.05)
IMM GRANULOCYTES NFR BLD AUTO: 0.1 % (ref 0–0.5)
KETONES UR QL STRIP: NEGATIVE
LDLC SERPL CALC-MCNC: 118 MG/DL (ref 0–100)
LDLC/HDLC SERPL: 2.76 {RATIO}
LEUKOCYTE ESTERASE UR QL STRIP.AUTO: NEGATIVE
LYMPHOCYTES # BLD AUTO: 2.34 10*3/MM3 (ref 0.7–3.1)
LYMPHOCYTES NFR BLD AUTO: 33.9 % (ref 19.6–45.3)
MCH RBC QN AUTO: 29.3 PG (ref 26.6–33)
MCHC RBC AUTO-ENTMCNC: 34.2 G/DL (ref 31.5–35.7)
MCV RBC AUTO: 85.6 FL (ref 79–97)
MICROALBUMIN/CREAT UR: 9.2 MG/G (ref 0–29)
MONOCYTES # BLD AUTO: 0.58 10*3/MM3 (ref 0.1–0.9)
MONOCYTES NFR BLD AUTO: 8.4 % (ref 5–12)
NEUTROPHILS NFR BLD AUTO: 3.75 10*3/MM3 (ref 1.7–7)
NEUTROPHILS NFR BLD AUTO: 54.4 % (ref 42.7–76)
NITRITE UR QL STRIP: NEGATIVE
NRBC BLD AUTO-RTO: 0 /100 WBC (ref 0–0.2)
PH UR STRIP.AUTO: 6.5 [PH] (ref 5–8)
PLATELET # BLD AUTO: 329 10*3/MM3 (ref 140–450)
PMV BLD AUTO: 10.4 FL (ref 6–12)
POTASSIUM SERPL-SCNC: 3.5 MMOL/L (ref 3.5–5.2)
PROT SERPL-MCNC: 7.6 G/DL (ref 6–8.5)
PROT UR QL STRIP: NEGATIVE
RBC # BLD AUTO: 4.71 10*6/MM3 (ref 3.77–5.28)
RBC # UR STRIP: ABNORMAL /HPF
REF LAB TEST METHOD: ABNORMAL
SODIUM SERPL-SCNC: 137 MMOL/L (ref 136–145)
SP GR UR STRIP: 1.02 (ref 1–1.03)
SQUAMOUS #/AREA URNS HPF: ABNORMAL /HPF
TRIGL SERPL-MCNC: 100 MG/DL (ref 0–150)
UROBILINOGEN UR QL STRIP: NORMAL
VLDLC SERPL-MCNC: 18 MG/DL (ref 5–40)
WBC # UR STRIP: ABNORMAL /HPF
WBC NRBC COR # BLD AUTO: 6.9 10*3/MM3 (ref 3.4–10.8)

## 2024-11-20 PROCEDURE — 80061 LIPID PANEL: CPT

## 2024-11-20 PROCEDURE — 82570 ASSAY OF URINE CREATININE: CPT

## 2024-11-20 PROCEDURE — 85025 COMPLETE CBC W/AUTO DIFF WBC: CPT

## 2024-11-20 PROCEDURE — 82306 VITAMIN D 25 HYDROXY: CPT

## 2024-11-20 PROCEDURE — 36415 COLL VENOUS BLD VENIPUNCTURE: CPT

## 2024-11-20 PROCEDURE — 82043 UR ALBUMIN QUANTITATIVE: CPT

## 2024-11-20 PROCEDURE — 80053 COMPREHEN METABOLIC PANEL: CPT

## 2024-11-20 PROCEDURE — 81001 URINALYSIS AUTO W/SCOPE: CPT

## 2024-11-20 PROCEDURE — 83036 HEMOGLOBIN GLYCOSYLATED A1C: CPT

## 2024-11-25 ENCOUNTER — OFFICE VISIT (OUTPATIENT)
Dept: FAMILY MEDICINE CLINIC | Facility: CLINIC | Age: 54
End: 2024-11-25
Payer: COMMERCIAL

## 2024-11-25 VITALS
TEMPERATURE: 97.1 F | OXYGEN SATURATION: 100 % | SYSTOLIC BLOOD PRESSURE: 112 MMHG | BODY MASS INDEX: 47.84 KG/M2 | HEART RATE: 83 BPM | DIASTOLIC BLOOD PRESSURE: 61 MMHG | WEIGHT: 253.4 LBS | RESPIRATION RATE: 16 BRPM | HEIGHT: 61 IN

## 2024-11-25 DIAGNOSIS — I10 ESSENTIAL HYPERTENSION: Chronic | ICD-10-CM

## 2024-11-25 DIAGNOSIS — E66.01 CLASS 3 SEVERE OBESITY DUE TO EXCESS CALORIES WITH SERIOUS COMORBIDITY AND BODY MASS INDEX (BMI) OF 45.0 TO 49.9 IN ADULT: Chronic | ICD-10-CM

## 2024-11-25 DIAGNOSIS — E78.2 MIXED HYPERLIPIDEMIA: Chronic | ICD-10-CM

## 2024-11-25 DIAGNOSIS — Z00.00 ANNUAL PHYSICAL EXAM: ICD-10-CM

## 2024-11-25 DIAGNOSIS — E66.813 CLASS 3 SEVERE OBESITY DUE TO EXCESS CALORIES WITH SERIOUS COMORBIDITY AND BODY MASS INDEX (BMI) OF 45.0 TO 49.9 IN ADULT: Chronic | ICD-10-CM

## 2024-11-25 DIAGNOSIS — E53.8 B12 DEFICIENCY: Chronic | ICD-10-CM

## 2024-11-25 DIAGNOSIS — E11.9 TYPE 2 DIABETES MELLITUS WITHOUT COMPLICATION, WITHOUT LONG-TERM CURRENT USE OF INSULIN: Primary | Chronic | ICD-10-CM

## 2024-11-25 DIAGNOSIS — E55.9 VITAMIN D DEFICIENCY: Chronic | ICD-10-CM

## 2024-11-25 PROCEDURE — 99214 OFFICE O/P EST MOD 30 MIN: CPT | Performed by: FAMILY MEDICINE

## 2024-11-25 NOTE — ASSESSMENT & PLAN NOTE
Patient's (Body mass index is 47.91 kg/m².) indicates that they are morbidly/severely obese (BMI > 40 or > 35 with obesity - related health condition) with health conditions that include hypertension, diabetes mellitus, and dyslipidemias . Weight is improving with treatment. BMI  is above average; BMI management plan is completed. We discussed low calorie, low carb based diet program, portion control, and increasing exercise.

## 2024-11-25 NOTE — PROGRESS NOTES
"Chief Complaint  Hypertension, Hyperlipidemia, and Diabetes (Discuss labwork from 11/20/24)    Subjective        Serenity Fleming presents to [facility]  History of Present Illness  She is here today for the management of her chronic medical conditions. She is a retired nurse. She has anemia, hypertension, B12 deficiency, GERD, hyperlipidemia, morbid obesity, type 2 diabetes and vitamin D deficiency.  She is  and has 1 son.     She has been taking Mounjaro 15 mg weekly and her blood sugar is improving. She has lost 13 lbs since her last visit.     She checks her blood pressure at home and says it runs in the mid 120s systolically.     The patient has no other complaints today and denies chest pain, shortness of breath, weakness, numbness, nausea, vomiting, diarrhea, dizziness or syncopal event.    Today's concern : Follow up.   Onset was in the past 7 days. Pertinent negative symptoms include no abdominal pain, no anorexia, no joint pain, no change in stool, no chest pain, no chills, no congestion, no cough, no diaphoresis, no fatigue, no fever, no headaches, no joint swelling, no myalgias, no nausea, no neck pain, no numbness, no rash, no sore throat, no swollen glands, no dysuria, no vertigo, no visual change, no vomiting and no weakness.   Hypertension  Pertinent negatives include no chest pain, headaches or neck pain.   Hyperlipidemia  Pertinent negatives include no chest pain or myalgias.   Diabetes  Pertinent negatives for hypoglycemia include no headaches. Pertinent negatives for diabetes include no chest pain, no fatigue, no visual change and no weakness.       Objective   Vital Signs:  /61   Pulse 83   Temp 97.1 °F (36.2 °C)   Resp 16   Ht 154.9 cm (60.98\")   Wt 115 kg (253 lb 6.4 oz)   SpO2 100%   BMI 47.91 kg/m²   Estimated body mass index is 47.91 kg/m² as calculated from the following:    Height as of this encounter: 154.9 cm (60.98\").    Weight as of " this encounter: 115 kg (253 lb 6.4 oz).            Physical Exam  Vitals reviewed.   Constitutional:       Appearance: She is well-developed. She is morbidly obese.   HENT:      Head: Normocephalic and atraumatic.      Right Ear: External ear normal.      Left Ear: External ear normal.      Mouth/Throat:      Pharynx: No oropharyngeal exudate.   Eyes:      Conjunctiva/sclera: Conjunctivae normal.      Pupils: Pupils are equal, round, and reactive to light.   Neck:      Vascular: No carotid bruit.   Cardiovascular:      Rate and Rhythm: Normal rate and regular rhythm.      Heart sounds: No murmur heard.     No friction rub. No gallop.   Pulmonary:      Effort: Pulmonary effort is normal.      Breath sounds: Normal breath sounds. No wheezing or rhonchi.   Abdominal:      General: There is no distension.   Skin:     General: Skin is warm and dry.   Neurological:      Mental Status: She is alert and oriented to person, place, and time.      Cranial Nerves: No cranial nerve deficit.      Motor: No weakness.   Psychiatric:         Mood and Affect: Mood and affect normal.         Behavior: Behavior normal.         Thought Content: Thought content normal.         Judgment: Judgment normal.        Result Review :    CMP          1/17/2024    10:38 7/18/2024    12:08 11/20/2024    10:41   CMP   Glucose 102  88  98    BUN 9  10  10    Creatinine 0.90  0.89  0.92    EGFR 75.7  76.2  73.2    Sodium 139  138  137    Potassium 3.6  4.0  3.5    Chloride 98  100  99    Calcium 9.6  9.7  9.7    Total Protein 7.6  8.1  7.6    Albumin 4.4  4.3  3.9    Globulin 3.2  3.8  3.7    Total Bilirubin 0.4  0.5  0.4    Alkaline Phosphatase 89  100  112    AST (SGOT) 34  30  31    ALT (SGPT) 25  25  33    Albumin/Globulin Ratio 1.4  1.1  1.1    BUN/Creatinine Ratio 10.0  11.2  10.9    Anion Gap 13.9  12.4  11.4      CBC          1/17/2024    10:38 11/20/2024    10:41   CBC   WBC 6.97  6.90    RBC 4.82  4.71    Hemoglobin 14.0  13.8    Hematocrit  41.4  40.3    MCV 85.9  85.6    MCH 29.0  29.3    MCHC 33.8  34.2    RDW 13.2  12.9    Platelets 322  329      Lipid Panel          1/17/2024    10:38 11/20/2024    10:41   Lipid Panel   Total Cholesterol 194  178    Triglycerides 108  100    HDL Cholesterol 50  42    VLDL Cholesterol 19  18    LDL Cholesterol  125  118    LDL/HDL Ratio 2.45  2.76      TSH          1/17/2024    10:38   TSH   TSH 1.670                Assessment and Plan   Diagnoses and all orders for this visit:    1. Type 2 diabetes mellitus without complication, without long-term current use of insulin (Primary)  Assessment & Plan:  Diabetes is improving with treatment.   Continue current treatment regimen.  Recommended an ADA diet.  Diabetes will be reassessed in 6 months    Orders:  -     Comprehensive Metabolic Panel; Future  -     Microalbumin / Creatinine Urine Ratio - Urine, Clean Catch; Future  -     Hemoglobin A1c; Future    2. Essential hypertension  Assessment & Plan:  Hypertension is stable and controlled  Continue current treatment regimen.  Dietary sodium restriction.  Weight loss.  Blood pressure will be reassessed in 6 months.      3. Mixed hyperlipidemia  -     Lipid Panel; Future    4. B12 deficiency  -     Vitamin B12; Future  -     Folate; Future    5. Class 3 severe obesity due to excess calories with serious comorbidity and body mass index (BMI) of 45.0 to 49.9 in adult  Assessment & Plan:  Patient's (Body mass index is 47.91 kg/m².) indicates that they are morbidly/severely obese (BMI > 40 or > 35 with obesity - related health condition) with health conditions that include hypertension, diabetes mellitus, and dyslipidemias . Weight is improving with treatment. BMI  is above average; BMI management plan is completed. We discussed low calorie, low carb based diet program, portion control, and increasing exercise.       6. Vitamin D deficiency  -     Vitamin D 25 hydroxy; Future    7. Annual physical exam  -     TSH+Free T4;  Future  -     CBC & Differential; Future  -     Urinalysis With Microscopic - Urine, Clean Catch; Future             Follow Up   Return in about 6 months (around 5/25/2025).  Patient was given instructions and counseling regarding her condition or for health maintenance advice. Please see specific information pulled into the AVS if appropriate.             Answers submitted by the patient for this visit:  Primary Reason for Visit (Submitted on 11/20/2024)  What is the primary reason for your visit?: Problem Not Listed

## 2024-12-02 DIAGNOSIS — I10 ESSENTIAL HYPERTENSION: Chronic | ICD-10-CM

## 2024-12-02 RX ORDER — LOSARTAN POTASSIUM 25 MG/1
25 TABLET ORAL DAILY
Qty: 90 TABLET | Refills: 0 | Status: SHIPPED | OUTPATIENT
Start: 2024-12-02

## 2025-01-13 RX ORDER — NYSTATIN 100000 U/G
1 CREAM TOPICAL AS NEEDED
Qty: 60 G | Refills: 3 | Status: SHIPPED | OUTPATIENT
Start: 2025-01-13

## 2025-01-13 RX ORDER — NYSTATIN 100000 [USP'U]/G
POWDER TOPICAL 2 TIMES DAILY
Qty: 60 G | Refills: 3 | Status: SHIPPED | OUTPATIENT
Start: 2025-01-13

## 2025-01-13 RX ORDER — ONDANSETRON 4 MG/1
4 TABLET, FILM COATED ORAL EVERY 8 HOURS PRN
Qty: 30 TABLET | Refills: 0 | OUTPATIENT
Start: 2025-01-13

## 2025-01-13 NOTE — TELEPHONE ENCOUNTER
Pt is requesting a refill of Zofran 4 Mg tab    Last refill: 7/5/24  Last ov:2/8/21  Next ov:  n/a    Had procedure 1/11/2024

## 2025-01-30 ENCOUNTER — HOSPITAL ENCOUNTER (OUTPATIENT)
Facility: HOSPITAL | Age: 55
Discharge: HOME | End: 2025-01-30
Admitting: OBSTETRICS & GYNECOLOGY
Payer: COMMERCIAL

## 2025-01-30 ENCOUNTER — OFFICE VISIT (OUTPATIENT)
Dept: OBSTETRICS AND GYNECOLOGY | Age: 55
End: 2025-01-30
Payer: COMMERCIAL

## 2025-01-30 VITALS
BODY MASS INDEX: 47.39 KG/M2 | DIASTOLIC BLOOD PRESSURE: 82 MMHG | WEIGHT: 251 LBS | HEIGHT: 61 IN | SYSTOLIC BLOOD PRESSURE: 124 MMHG

## 2025-01-30 DIAGNOSIS — Z12.31 SCREENING MAMMOGRAM FOR BREAST CANCER: ICD-10-CM

## 2025-01-30 DIAGNOSIS — Z01.419 ENCOUNTER FOR GYNECOLOGICAL EXAMINATION WITHOUT ABNORMAL FINDING: Primary | ICD-10-CM

## 2025-01-30 DIAGNOSIS — Z12.31 VISIT FOR SCREENING MAMMOGRAM: ICD-10-CM

## 2025-01-30 DIAGNOSIS — B37.2 INTERTRIGINOUS CANDIDIASIS: ICD-10-CM

## 2025-01-30 DIAGNOSIS — N62 LARGE BREASTS: ICD-10-CM

## 2025-01-30 DIAGNOSIS — E55.9 VITAMIN D DEFICIENCY: ICD-10-CM

## 2025-01-30 DIAGNOSIS — L98.7 EXCESSIVE AND REDUNDANT SKIN AND SUBCUTANEOUS TISSUE: ICD-10-CM

## 2025-01-30 PROCEDURE — 77063 BREAST TOMOSYNTHESIS BI: CPT

## 2025-01-30 PROCEDURE — 77067 SCR MAMMO BI INCL CAD: CPT

## 2025-01-30 RX ORDER — ONDANSETRON 4 MG/1
4 TABLET, FILM COATED ORAL EVERY 8 HOURS PRN
Qty: 30 TABLET | Refills: 1 | Status: SHIPPED | OUTPATIENT
Start: 2025-01-30

## 2025-01-30 RX ORDER — ERGOCALCIFEROL 1.25 MG/1
50000 CAPSULE, LIQUID FILLED ORAL
Qty: 12 CAPSULE | Refills: 1 | Status: SHIPPED | OUTPATIENT
Start: 2025-01-30

## 2025-01-30 NOTE — PROGRESS NOTES
Routine Annual Visit    2025    Patient: Serenity Fleming          MR#:[id number]      Chief Complaint   Patient presents with    Gynecologic Exam     Annual Exam - last pap 23 neg, mammo today, colonoscopy 24, pt asking for refills on nystatin power/cream, pt stating she uses zofran for nausea due to manjauro, pt asking if we can refill this for her today, pt has no other complaints       History of Present Illness    54 y.o. female  who presents for annual exam.     Patient is feeling well  She has no vaginal bleeding  She has has been losing weight and is finding that she has some excess skin folds  This is causing issues with rubbing against clothing and also some yeast in the skin folds.  She also has very large breasts and this causes upper back pain  She wears a double D bra size  She has lost 50 pounds over the past year  Pap is up-to-date  Mammogram today  Colonoscopy is up-to-date        No LMP recorded (lmp unknown). Patient is postmenopausal.  Obstetric History:  OB History          1    Para   0    Term   0            AB        Living             SAB        IAB        Ectopic        Molar        Multiple        Live Births                   Menstrual History:     No LMP recorded (lmp unknown). Patient is postmenopausal.       Sexual History:       ________________________________________  Patient Active Problem List   Diagnosis    Anemia    Esophageal reflux    Essential hypertension    Class 3 severe obesity due to excess calories with serious comorbidity and body mass index (BMI) of 45.0 to 49.9 in adult    PCOS (polycystic ovarian syndrome)    Type 2 diabetes mellitus without complication, without long-term current use of insulin    Hyperlipidemia    Vitamin D deficiency    B12 deficiency    Acute medial meniscus tear of left knee    Primary osteoarthritis of left knee    Colon cancer screening       Past Medical History:   Diagnosis Date    Acute medial meniscus  tear     LEFT    Allergic     Allergic to Codeine    Anemia     NO CURRENT ISSUES    Anxiety     B12 deficiency 2022    Claustrophobia     Colon polyps     Essential hypertension     GERD (gastroesophageal reflux disease)     Gestational hypertension 2001    Glaucoma     Hyperlipidemia 2022    Hypertension     PCOS (polycystic ovarian syndrome) 2022    Polycystic ovary syndrome     PONV (postoperative nausea and vomiting)     Type 2 diabetes mellitus without complication, without long-term current use of insulin 2022    NO OTHER MEDS, DIET AND EXERCISE TO MANAGE    Varicella 1974    Vitamin D deficiency 2022       Past Surgical History:   Procedure Laterality Date    ADENOIDECTOMY       SECTION      CHOLECYSTECTOMY      COLONOSCOPY      COLONOSCOPY N/A 2024    Procedure: COLONOSCOPY WITH COLD SNARE POLYPECTOMIES;  Surgeon: Steph Donohue MD;  Location: [facility] ENDOSCOPY;  Service: Gastroenterology;  Laterality: N/A;  COLON POLYPS, DIVERTICULOSIS    DILATATION AND CURETTAGE      ENDOMETRIAL ABLATION      ENDOSCOPY  2019    KNEE ARTHROSCOPY W/ MENISCECTOMY Left 10/24/2023    Procedure: KNEE ARTHROSCOPY WITH PARTIAL MEDIAL MENISCECTOMY, CHONDROPLASTY;  Surgeon: Simon Hwang MD;  Location: [facility] OR [facility];  Service: Orthopedics;  Laterality: Left;    KNEE CARTILAGE SURGERY Right     RIGHT MENISCUS SURGERY    LAPAROSCOPIC CHOLECYSTECTOMY      ROTATOR CUFF REPAIR Right     TONSILLECTOMY      UPPER GASTROINTESTINAL ENDOSCOPY         Social History     Tobacco Use   Smoking Status Never    Passive exposure: Never   Smokeless Tobacco Never   Tobacco Comments    Past childhood smoke exposure , no current.        has a current medication list which includes the following prescription(s): vitamin d3, glucose blood, glucose monitor, hydrochlorothiazide, freestyle, losartan, multiple vitamin, nystatin, nystatin, omeprazole, ondansetron, tirzepatide, and vitamin  "d.  ________________________________________    Current contraception: post menopausal status  History of abnormal Pap smear: no  Family history of Breast cancer: yes  Family history of uterine or ovarian cancer: no  Family History of colon cancer/colon polyps: no  History of abnormal mammogram: no      The following portions of the patient's history were reviewed and updated as appropriate: allergies, current medications, past family history, past medical history, past social history, past surgical history, and problem list.    Review of Systems    Pertinent items are noted in HPI.     Objective   Physical Exam    /82 (BP Location: Left arm, Patient Position: Sitting)   Ht 154.9 cm (60.98\")   Wt 114 kg (251 lb)   LMP  (LMP Unknown)   BMI 47.46 kg/m²    BP Readings from Last 3 Encounters:   01/30/25 124/82   11/25/24 112/61   07/23/24 122/73      Wt Readings from Last 3 Encounters:   01/30/25 114 kg (251 lb)   11/25/24 115 kg (253 lb 6.4 oz)   07/23/24 121 kg (266 lb 9.6 oz)      BMI: Estimated body mass index is 47.46 kg/m² as calculated from the following:    Height as of this encounter: 154.9 cm (60.98\").    Weight as of this encounter: 114 kg (251 lb).      General:   alert, appears stated age, and cooperative   Abdomen: soft, non-tender, without masses or organomegaly  Yeast rash under pannus   Breast: inspection negative, no nipple discharge or bleeding, no masses or nodularity palpable  Yeast rash under breasts   Vulva: normal, Bartholin's, Urethra, Twin's normal   Vagina: normal mucosa   Cervix: no cervical motion tenderness and no lesions   Uterus: normal size, mobile, and non-tender   Adnexa: no mass, fullness, tenderness     Assessment:    1. Normal annual exam   Assessment     ICD-10-CM ICD-9-CM   1. Encounter for gynecological examination without abnormal finding  Z01.419 V72.31   2. Screening mammogram for breast cancer  Z12.31 V76.12   3. Excessive and redundant skin and subcutaneous tissue "  L98.7 701.9   4. Large breasts  N62 611.1   5. Intertriginous candidiasis  B37.2 112.3     Plan:    Plan     [x]  Mammogram request made  []  PAP done  []  Labs:   []  GC/Chl/TV  []  DEXA scan   []  Referral for colonoscopy:       Diagnoses and all orders for this visit:    1. Encounter for gynecological examination without abnormal finding (Primary)    2. Screening mammogram for breast cancer  -     Mammo Screening Digital Tomosynthesis Bilateral With CAD; Future    3. Excessive and redundant skin and subcutaneous tissue    4. Large breasts    5. Intertriginous candidiasis    Other orders  -     ondansetron (Zofran) 4 MG tablet; Take 1 tablet by mouth Every 8 (Eight) Hours As Needed for Nausea or Vomiting.  Dispense: 30 tablet; Refill: 1      Patient may need surgical correction of her excessive and redundant skin after weight loss, as well as breast reduction due to pain from large breasts      Counseling:  --Nutrition: Stressed importance of moderation and caloric balance, stressed fresh fruit and vegetables  --Exercise: Stressed the importance of regular exercise. 3-5 times weekly   - Discussed screening mammogram recommendations.   --Discussed benefits of screening colonoscopy- age 45 unless FH  --Discussed pap smear screening recommendations

## 2025-02-03 RX ORDER — HYDROCHLOROTHIAZIDE 12.5 MG/1
12.5 TABLET ORAL DAILY
Qty: 90 TABLET | Refills: 3 | Status: SHIPPED | OUTPATIENT
Start: 2025-02-03

## 2025-02-05 ENCOUNTER — TELEPHONE (OUTPATIENT)
Dept: OBSTETRICS AND GYNECOLOGY | Age: 55
End: 2025-02-05
Payer: COMMERCIAL

## 2025-02-17 ENCOUNTER — TELEPHONE (OUTPATIENT)
Dept: FAMILY MEDICINE CLINIC | Facility: CLINIC | Age: 55
End: 2025-02-17
Payer: COMMERCIAL

## 2025-02-17 NOTE — TELEPHONE ENCOUNTER
Pt contacted clinic requesting refill for Mounjaro 15mg.     Last OV: 11/25/24  Future OV: 5/27/2025

## 2025-02-28 DIAGNOSIS — I10 ESSENTIAL HYPERTENSION: Chronic | ICD-10-CM

## 2025-02-28 RX ORDER — LOSARTAN POTASSIUM 25 MG/1
25 TABLET ORAL DAILY
Qty: 90 TABLET | Refills: 0 | Status: SHIPPED | OUTPATIENT
Start: 2025-02-28

## 2025-05-20 ENCOUNTER — LAB (OUTPATIENT)
Dept: LAB | Facility: HOSPITAL | Age: 55
End: 2025-05-20
Payer: COMMERCIAL

## 2025-05-20 DIAGNOSIS — E55.9 VITAMIN D DEFICIENCY: Chronic | ICD-10-CM

## 2025-05-20 DIAGNOSIS — E78.2 MIXED HYPERLIPIDEMIA: Chronic | ICD-10-CM

## 2025-05-20 DIAGNOSIS — E53.8 B12 DEFICIENCY: Chronic | ICD-10-CM

## 2025-05-20 DIAGNOSIS — Z00.00 ANNUAL PHYSICAL EXAM: ICD-10-CM

## 2025-05-20 DIAGNOSIS — E11.9 TYPE 2 DIABETES MELLITUS WITHOUT COMPLICATION, WITHOUT LONG-TERM CURRENT USE OF INSULIN: Chronic | ICD-10-CM

## 2025-05-20 LAB
25(OH)D3 SERPL-MCNC: 57.1 NG/ML (ref 30–100)
ALBUMIN SERPL-MCNC: 4.1 G/DL (ref 3.5–5.2)
ALBUMIN UR-MCNC: 1.6 MG/DL
ALBUMIN/GLOB SERPL: 1.2 G/DL
ALP SERPL-CCNC: 95 U/L (ref 39–117)
ALT SERPL W P-5'-P-CCNC: 26 U/L (ref 1–33)
ANION GAP SERPL CALCULATED.3IONS-SCNC: 12 MMOL/L (ref 5–15)
AST SERPL-CCNC: 28 U/L (ref 1–32)
BACTERIA UR QL AUTO: ABNORMAL /HPF
BASOPHILS # BLD AUTO: 0.03 10*3/MM3 (ref 0–0.2)
BASOPHILS NFR BLD AUTO: 0.5 % (ref 0–1.5)
BILIRUB SERPL-MCNC: 0.5 MG/DL (ref 0–1.2)
BILIRUB UR QL STRIP: NEGATIVE
BUN SERPL-MCNC: 7 MG/DL (ref 6–20)
BUN/CREAT SERPL: 9 (ref 7–25)
CALCIUM SPEC-SCNC: 9.9 MG/DL (ref 8.6–10.5)
CHLORIDE SERPL-SCNC: 98 MMOL/L (ref 98–107)
CHOLEST SERPL-MCNC: 165 MG/DL (ref 0–200)
CLARITY UR: ABNORMAL
CO2 SERPL-SCNC: 28 MMOL/L (ref 22–29)
COLOR UR: YELLOW
CREAT SERPL-MCNC: 0.78 MG/DL (ref 0.57–1)
CREAT UR-MCNC: 260.4 MG/DL
DEPRECATED RDW RBC AUTO: 42.8 FL (ref 37–54)
EGFRCR SERPLBLD CKD-EPI 2021: 88.7 ML/MIN/1.73
EOSINOPHIL # BLD AUTO: 0.12 10*3/MM3 (ref 0–0.4)
EOSINOPHIL NFR BLD AUTO: 2.2 % (ref 0.3–6.2)
ERYTHROCYTE [DISTWIDTH] IN BLOOD BY AUTOMATED COUNT: 13.3 % (ref 12.3–15.4)
FOLATE SERPL-MCNC: >20 NG/ML (ref 4.78–24.2)
GLOBULIN UR ELPH-MCNC: 3.3 GM/DL
GLUCOSE SERPL-MCNC: 77 MG/DL (ref 65–99)
GLUCOSE UR STRIP-MCNC: NEGATIVE MG/DL
HBA1C MFR BLD: 5.2 % (ref 4.8–5.6)
HCT VFR BLD AUTO: 39.9 % (ref 34–46.6)
HDLC SERPL-MCNC: 50 MG/DL (ref 40–60)
HGB BLD-MCNC: 13.4 G/DL (ref 12–15.9)
HGB UR QL STRIP.AUTO: NEGATIVE
HYALINE CASTS UR QL AUTO: ABNORMAL /LPF
IMM GRANULOCYTES # BLD AUTO: 0.01 10*3/MM3 (ref 0–0.05)
IMM GRANULOCYTES NFR BLD AUTO: 0.2 % (ref 0–0.5)
KETONES UR QL STRIP: ABNORMAL
LDLC SERPL CALC-MCNC: 100 MG/DL (ref 0–100)
LDLC/HDLC SERPL: 1.99 {RATIO}
LEUKOCYTE ESTERASE UR QL STRIP.AUTO: ABNORMAL
LYMPHOCYTES # BLD AUTO: 1.69 10*3/MM3 (ref 0.7–3.1)
LYMPHOCYTES NFR BLD AUTO: 30.5 % (ref 19.6–45.3)
MCH RBC QN AUTO: 29.6 PG (ref 26.6–33)
MCHC RBC AUTO-ENTMCNC: 33.6 G/DL (ref 31.5–35.7)
MCV RBC AUTO: 88.1 FL (ref 79–97)
MICROALBUMIN/CREAT UR: 6.1 MG/G (ref 0–29)
MONOCYTES # BLD AUTO: 0.44 10*3/MM3 (ref 0.1–0.9)
MONOCYTES NFR BLD AUTO: 7.9 % (ref 5–12)
NEUTROPHILS NFR BLD AUTO: 3.25 10*3/MM3 (ref 1.7–7)
NEUTROPHILS NFR BLD AUTO: 58.7 % (ref 42.7–76)
NITRITE UR QL STRIP: NEGATIVE
NRBC BLD AUTO-RTO: 0 /100 WBC (ref 0–0.2)
PH UR STRIP.AUTO: 6 [PH] (ref 5–8)
PLATELET # BLD AUTO: 260 10*3/MM3 (ref 140–450)
PMV BLD AUTO: 11 FL (ref 6–12)
POTASSIUM SERPL-SCNC: 3.5 MMOL/L (ref 3.5–5.2)
PROT SERPL-MCNC: 7.4 G/DL (ref 6–8.5)
PROT UR QL STRIP: ABNORMAL
RBC # BLD AUTO: 4.53 10*6/MM3 (ref 3.77–5.28)
RBC # UR STRIP: ABNORMAL /HPF
REF LAB TEST METHOD: ABNORMAL
SODIUM SERPL-SCNC: 138 MMOL/L (ref 136–145)
SP GR UR STRIP: 1.02 (ref 1–1.03)
SQUAMOUS #/AREA URNS HPF: ABNORMAL /HPF
T4 FREE SERPL-MCNC: 1.1 NG/DL (ref 0.92–1.68)
TRIGL SERPL-MCNC: 78 MG/DL (ref 0–150)
TSH SERPL DL<=0.05 MIU/L-ACNC: 1.45 UIU/ML (ref 0.27–4.2)
UROBILINOGEN UR QL STRIP: ABNORMAL
VIT B12 BLD-MCNC: 491 PG/ML (ref 211–946)
VLDLC SERPL-MCNC: 15 MG/DL (ref 5–40)
WBC # UR STRIP: ABNORMAL /HPF
WBC NRBC COR # BLD AUTO: 5.54 10*3/MM3 (ref 3.4–10.8)

## 2025-05-20 PROCEDURE — 80050 GENERAL HEALTH PANEL: CPT

## 2025-05-20 PROCEDURE — 83036 HEMOGLOBIN GLYCOSYLATED A1C: CPT

## 2025-05-20 PROCEDURE — 82607 VITAMIN B-12: CPT

## 2025-05-20 PROCEDURE — 82746 ASSAY OF FOLIC ACID SERUM: CPT

## 2025-05-20 PROCEDURE — 81001 URINALYSIS AUTO W/SCOPE: CPT

## 2025-05-20 PROCEDURE — 82043 UR ALBUMIN QUANTITATIVE: CPT

## 2025-05-20 PROCEDURE — 80061 LIPID PANEL: CPT

## 2025-05-20 PROCEDURE — 82570 ASSAY OF URINE CREATININE: CPT

## 2025-05-20 PROCEDURE — 84439 ASSAY OF FREE THYROXINE: CPT

## 2025-05-20 PROCEDURE — 82306 VITAMIN D 25 HYDROXY: CPT

## 2025-05-20 PROCEDURE — 36415 COLL VENOUS BLD VENIPUNCTURE: CPT

## 2025-05-27 ENCOUNTER — OFFICE VISIT (OUTPATIENT)
Dept: FAMILY MEDICINE CLINIC | Facility: CLINIC | Age: 55
End: 2025-05-27
Payer: COMMERCIAL

## 2025-05-27 VITALS
RESPIRATION RATE: 17 BRPM | OXYGEN SATURATION: 100 % | BODY MASS INDEX: 43.01 KG/M2 | SYSTOLIC BLOOD PRESSURE: 128 MMHG | TEMPERATURE: 98 F | WEIGHT: 227.8 LBS | HEART RATE: 74 BPM | DIASTOLIC BLOOD PRESSURE: 82 MMHG | HEIGHT: 61 IN

## 2025-05-27 DIAGNOSIS — Z00.01 ENCOUNTER FOR GENERAL ADULT MEDICAL EXAMINATION WITH ABNORMAL FINDINGS: Primary | ICD-10-CM

## 2025-05-27 DIAGNOSIS — G89.29 CHRONIC MIDLINE LOW BACK PAIN WITHOUT SCIATICA: ICD-10-CM

## 2025-05-27 DIAGNOSIS — M54.50 CHRONIC MIDLINE LOW BACK PAIN WITHOUT SCIATICA: ICD-10-CM

## 2025-05-27 DIAGNOSIS — B37.2 YEAST DERMATITIS: ICD-10-CM

## 2025-05-27 DIAGNOSIS — N62 MACROMASTIA: ICD-10-CM

## 2025-05-27 DIAGNOSIS — I15.2 HYPERTENSION DUE TO ENDOCRINE DISORDER: ICD-10-CM

## 2025-05-27 DIAGNOSIS — E11.9 TYPE 2 DIABETES MELLITUS WITHOUT COMPLICATION, WITHOUT LONG-TERM CURRENT USE OF INSULIN: Chronic | ICD-10-CM

## 2025-05-27 DIAGNOSIS — E55.9 VITAMIN D DEFICIENCY: Chronic | ICD-10-CM

## 2025-05-27 PROBLEM — E66.01 CLASS 3 SEVERE OBESITY DUE TO EXCESS CALORIES WITH SERIOUS COMORBIDITY AND BODY MASS INDEX (BMI) OF 45.0 TO 49.9 IN ADULT: Chronic | Status: RESOLVED | Noted: 2022-03-10 | Resolved: 2025-05-27

## 2025-05-27 PROBLEM — E66.813 CLASS 3 SEVERE OBESITY DUE TO EXCESS CALORIES WITH SERIOUS COMORBIDITY AND BODY MASS INDEX (BMI) OF 45.0 TO 49.9 IN ADULT: Chronic | Status: RESOLVED | Noted: 2022-03-10 | Resolved: 2025-05-27

## 2025-05-27 PROBLEM — S83.242A ACUTE MEDIAL MENISCUS TEAR OF LEFT KNEE: Status: RESOLVED | Noted: 2023-10-16 | Resolved: 2025-05-27

## 2025-05-27 NOTE — PROGRESS NOTES
Chief Complaint  Hypertension, Annual Exam, and Dizziness (Pt states that she's been experiencing episodes of lightheadedness with position changes for roughly 1 month. Pt has lost weight and is inquiring about potential issues with antihypertensive dosages.)    Lauren Fleming presents to [facility]  History of Present Illness  She is here today for the management of her chronic medical conditions. She is a retired nurse. She has anemia, hypertension, B12 deficiency, GERD, hyperlipidemia, morbid obesity, type 2 diabetes and vitamin D deficiency.  She is  and has 1 son.     She has been taking Mounjaro 15 mg weekly and her blood sugar is improving. She has lost 39 lbs in the last year.     She is still having lower and upper back pain due to heavy breasts and lower abdomen apron. She is currently on nystatin cream and powder due to frequent yeast rash under breast and under abdominal folds. She is currently being seen by a chiropractor for upper and lower back pain as well.       She checks her blood pressure at home and says it runs in the mid 120s systolically.     The patient has no other complaints today and denies chest pain, shortness of breath, weakness, numbness, nausea, vomiting, diarrhea, dizziness or syncopal event.      Follow up appointment  Onset was at an unknown time.   Pertinent negative symptoms include no abdominal pain, no anorexia, no joint pain, no change in stool, no chest pain, no chills, no congestion, no cough, no diaphoresis, no fatigue, no fever, no headaches, no joint swelling, no myalgias, no nausea, no neck pain, no numbness, no rash, no sore throat, no swollen glands, no dysuria, no vertigo, no visual change, no vomiting and no weakness.   Hypertension  Associated symptoms: dizziness    Associated symptoms: no chest pain, no headaches and no neck pain    Dizziness  Pertinent negative symptoms include no abdominal pain, no  "anorexia, no joint pain, no change in stool, no chest pain, no chills, no congestion, no cough, no diaphoresis, no fatigue, no fever, no headaches, no joint swelling, no myalgias, no nausea, no neck pain, no numbness, no rash, no sore throat, no swollen glands, no dysuria, no vertigo, no visual change, no vomiting and no weakness.           Objective   Vital Signs:  /82 (BP Location: Left arm, Patient Position: Sitting, Cuff Size: Adult)   Pulse 74   Temp 98 °F (36.7 °C) (Temporal)   Resp 17   Ht 154.9 cm (60.98\")   Wt 103 kg (227 lb 12.8 oz)   SpO2 100%   BMI 43.07 kg/m²   Estimated body mass index is 43.07 kg/m² as calculated from the following:    Height as of this encounter: 154.9 cm (60.98\").    Weight as of this encounter: 103 kg (227 lb 12.8 oz).            Physical Exam  Vitals reviewed.   Constitutional:       Appearance: She is well-developed. She is morbidly obese.   HENT:      Head: Normocephalic and atraumatic.      Right Ear: External ear normal.      Left Ear: External ear normal.      Mouth/Throat:      Pharynx: No oropharyngeal exudate.   Eyes:      Conjunctiva/sclera: Conjunctivae normal.      Pupils: Pupils are equal, round, and reactive to light.   Neck:      Vascular: No carotid bruit.   Cardiovascular:      Rate and Rhythm: Normal rate and regular rhythm.      Heart sounds: No murmur heard.     No friction rub. No gallop.   Pulmonary:      Effort: Pulmonary effort is normal.      Breath sounds: Normal breath sounds. No wheezing or rhonchi.   Abdominal:      General: There is no distension.   Skin:     General: Skin is warm and dry.   Neurological:      Mental Status: She is alert and oriented to person, place, and time.      Cranial Nerves: No cranial nerve deficit.      Motor: No weakness.   Psychiatric:         Mood and Affect: Mood and affect normal.         Behavior: Behavior normal.         Thought Content: Thought content normal.         Judgment: Judgment normal.      "   Result Review :    CMP          7/18/2024    12:08 11/20/2024    10:41 5/20/2025    10:05   CMP   Glucose 88  98  77    BUN 10  10  7    Creatinine 0.89  0.92  0.78    EGFR 76.2  73.2  88.7    Sodium 138  137  138    Potassium 4.0  3.5  3.5    Chloride 100  99  98    Calcium 9.7  9.7  9.9    Total Protein 8.1  7.6  7.4    Albumin 4.3  3.9  4.1    Globulin 3.8  3.7  3.3    Total Bilirubin 0.5  0.4  0.5    Alkaline Phosphatase 100  112  95    AST (SGOT) 30  31  28    ALT (SGPT) 25  33  26    Albumin/Globulin Ratio 1.1  1.1  1.2    BUN/Creatinine Ratio 11.2  10.9  9.0    Anion Gap 12.4  11.4  12.0      CBC          11/20/2024    10:41 5/20/2025    10:05   CBC   WBC 6.90  5.54    RBC 4.71  4.53    Hemoglobin 13.8  13.4    Hematocrit 40.3  39.9    MCV 85.6  88.1    MCH 29.3  29.6    MCHC 34.2  33.6    RDW 12.9  13.3    Platelets 329  260      Lipid Panel          11/20/2024    10:41 5/20/2025    10:05   Lipid Panel   Total Cholesterol 178  165    Triglycerides 100  78    HDL Cholesterol 42  50    VLDL Cholesterol 18  15    LDL Cholesterol  118  100    LDL/HDL Ratio 2.76  1.99      TSH          5/20/2025    10:05   TSH   TSH 1.450                Assessment and Plan   Diagnoses and all orders for this visit:    1. Encounter for general adult medical examination with abnormal findings (Primary)  Assessment & Plan:  The patient was encouraged to always wear their seatbelt and never text and drive.  They were encouraged to get 7 to 8 hours sleep at night.  They were encouraged to exercise on a regular basis.  Screening labs were reviewed at today's visit and manage according to findings.        2. Hypertension due to endocrine disorder  Assessment & Plan:  Hypertension is stable and controlled  Continue current treatment regimen.  Dietary sodium restriction.  Weight loss.  Blood pressure will be reassessed in 6 months.    I am concerned that the patient's lightheadedness is due to hypotension.  She was encouraged to stand  up slowly.  She was encouraged to stop her hydrochlorothiazide 12.5 mg daily for a week to see if her symptoms of lightheadedness improved.      3. Type 2 diabetes mellitus without complication, without long-term current use of insulin  Assessment & Plan:  Diabetes is improving with treatment.   Continue current treatment regimen.  Recommended an ADA diet.  Diabetes will be reassessed in 6 months      4. Vitamin D deficiency  Assessment & Plan:  Her Vit D level is improving with vit D replacement as prescribed.       5. Yeast dermatitis  Assessment & Plan:  She is having chronic yeast infections under her panus and breasts do to excess skin around her abdomen and macromastia.       6. Chronic midline low back pain without sciatica  Assessment & Plan:  Symptoms are present daily and due to excess breast tissue and abdominal panus.       7. Macromastia                 Follow Up   Return in about 6 months (around 11/27/2025).  Patient was given instructions and counseling regarding her condition or for health maintenance advice. Please see specific information pulled into the AVS if appropriate.         Patient or patient representative verbalized consent for the use of Ambient Listening during the visit with  Capri Rodriguez DO for chart documentation. 5/28/2025  10:24 EDT

## 2025-05-28 PROBLEM — N62 MACROMASTIA: Status: ACTIVE | Noted: 2025-05-28

## 2025-05-28 PROBLEM — M54.50 CHRONIC MIDLINE LOW BACK PAIN WITHOUT SCIATICA: Chronic | Status: ACTIVE | Noted: 2025-05-28

## 2025-05-28 PROBLEM — M54.50 CHRONIC MIDLINE LOW BACK PAIN WITHOUT SCIATICA: Status: ACTIVE | Noted: 2025-05-28

## 2025-05-28 PROBLEM — G89.29 CHRONIC MIDLINE LOW BACK PAIN WITHOUT SCIATICA: Chronic | Status: ACTIVE | Noted: 2025-05-28

## 2025-05-28 PROBLEM — G89.29 CHRONIC MIDLINE LOW BACK PAIN WITHOUT SCIATICA: Status: ACTIVE | Noted: 2025-05-28

## 2025-05-28 PROBLEM — B37.2 YEAST DERMATITIS: Status: ACTIVE | Noted: 2025-05-28

## 2025-05-28 NOTE — ASSESSMENT & PLAN NOTE
She is having chronic yeast infections under her panus and breasts do to excess skin around her abdomen and macromastia.

## 2025-05-28 NOTE — ASSESSMENT & PLAN NOTE
Hypertension is stable and controlled  Continue current treatment regimen.  Dietary sodium restriction.  Weight loss.  Blood pressure will be reassessed in 6 months.    I am concerned that the patient's lightheadedness is due to hypotension.  She was encouraged to stand up slowly.  She was encouraged to stop her hydrochlorothiazide 12.5 mg daily for a week to see if her symptoms of lightheadedness improved.

## 2025-05-31 DIAGNOSIS — I10 ESSENTIAL HYPERTENSION: Chronic | ICD-10-CM

## 2025-06-02 RX ORDER — LOSARTAN POTASSIUM 25 MG/1
25 TABLET ORAL DAILY
Qty: 90 TABLET | Refills: 0 | Status: SHIPPED | OUTPATIENT
Start: 2025-06-02

## 2025-07-22 DIAGNOSIS — E55.9 VITAMIN D DEFICIENCY: ICD-10-CM

## 2025-07-22 RX ORDER — ERGOCALCIFEROL 1.25 MG/1
50000 CAPSULE, LIQUID FILLED ORAL WEEKLY
Qty: 12 CAPSULE | Refills: 1 | Status: SHIPPED | OUTPATIENT
Start: 2025-07-22

## 2025-08-13 RX ORDER — TIRZEPATIDE 15 MG/.5ML
INJECTION, SOLUTION SUBCUTANEOUS
Qty: 0.5 ML | Refills: 1 | Status: SHIPPED | OUTPATIENT
Start: 2025-08-13 | End: 2025-08-16 | Stop reason: SDUPTHER

## 2025-08-14 ENCOUNTER — TELEPHONE (OUTPATIENT)
Dept: FAMILY MEDICINE CLINIC | Facility: CLINIC | Age: 55
End: 2025-08-14
Payer: COMMERCIAL

## 2025-08-16 RX ORDER — TIRZEPATIDE 15 MG/.5ML
15 INJECTION, SOLUTION SUBCUTANEOUS WEEKLY
Qty: 6 ML | Refills: 1 | Status: SHIPPED | OUTPATIENT
Start: 2025-08-16 | End: 2025-11-14

## (undated) DEVICE — GOWN,REINFORCE,POLY,SIRUS,BREATH SLV,XLG: Brand: MEDLINE

## (undated) DEVICE — SUT ETHLN 3-0 FS118IN 663H

## (undated) DEVICE — BNDG ELAS ECON W/CLIP 4IN 5YD LF STRL

## (undated) DEVICE — STERILE POLYISOPRENE POWDER-FREE SURGICAL GLOVES: Brand: PROTEXIS

## (undated) DEVICE — Device

## (undated) DEVICE — BLD CUT FORMLA AGGR 3.5MM

## (undated) DEVICE — BNDG ELAS ECON W/CLIP 6IN 5YD LF STRL

## (undated) DEVICE — Device: Brand: DEFENDO AIR/WATER/SUCTION AND BIOPSY VALVE

## (undated) DEVICE — 90-S CRUISE, SUCTION PROBE, NON-BENDABLE, MAX CUT LEVEL 1: Brand: SERFAS ENERGY

## (undated) DEVICE — THE SINGLE USE ETRAP – POLYP TRAP IS USED FOR SUCTION RETRIEVAL OF ENDOSCOPICALLY REMOVED POLYPS.: Brand: ETRAP

## (undated) DEVICE — SOL IRR NACL 0.9PCT 3000ML

## (undated) DEVICE — LINER SURG CANSTR SXN S/RIGD 1500CC

## (undated) DEVICE — KNEE ARTHROSCOPY-LF: Brand: MEDLINE INDUSTRIES, INC.

## (undated) DEVICE — APPL CHLORAPREP HI/LITE 26ML ORNG

## (undated) DEVICE — INTEGRATED CASSETTE TUBING, DO NOT USE IF PACKAGE IS DAMAGED: Brand: CROSSFLOW

## (undated) DEVICE — GLV SURG SENSICARE PI ORTHO SZ8 LF STRL

## (undated) DEVICE — CONN JET HYDRA H20 AUXILIARY DISP

## (undated) DEVICE — DISPOSABLE TOURNIQUET CUFF SINGLE BLADDER, SINGLE PORT AND QUICK CONNECT CONNECTOR: Brand: COLOR CUFF

## (undated) DEVICE — SNAR POLYP CAPTIFLEX XS/OVL 11X2.4MM 240CM 1P/U

## (undated) DEVICE — SOLIDIFIER LIQLOC PLS 1500CC BT

## (undated) DEVICE — STERILE POLYISOPRENE POWDER-FREE SURGICAL GLOVES WITH EMOLLIENT COATING: Brand: PROTEXIS

## (undated) DEVICE — DRSNG GZ PETROLTM XEROFORM CURAD 1X8IN STRL

## (undated) DEVICE — SOL IRRG H2O PL/BG 1000ML STRL

## (undated) DEVICE — GLV SURG SENSICARE SLT PF LF 6 STRL